# Patient Record
Sex: MALE | Race: WHITE | NOT HISPANIC OR LATINO | Employment: OTHER | URBAN - METROPOLITAN AREA
[De-identification: names, ages, dates, MRNs, and addresses within clinical notes are randomized per-mention and may not be internally consistent; named-entity substitution may affect disease eponyms.]

---

## 2017-10-02 ENCOUNTER — HOSPITAL ENCOUNTER (EMERGENCY)
Facility: OTHER | Age: 62
Discharge: HOME | End: 2017-10-02
Attending: EMERGENCY MEDICINE
Payer: COMMERCIAL

## 2017-10-02 VITALS
SYSTOLIC BLOOD PRESSURE: 159 MMHG | DIASTOLIC BLOOD PRESSURE: 90 MMHG | BODY MASS INDEX: 29.1 KG/M2 | TEMPERATURE: 98 F | WEIGHT: 192 LBS | HEART RATE: 78 BPM | RESPIRATION RATE: 15 BRPM | OXYGEN SATURATION: 97 % | HEIGHT: 68 IN

## 2017-10-02 DIAGNOSIS — N30.91 CYSTITIS WITH HEMATURIA: Primary | ICD-10-CM

## 2017-10-02 LAB
BACTERIA #/AREA URNS HPF: ABNORMAL /HPF
BILIRUB UR QL STRIP: ABNORMAL
CLARITY UR: ABNORMAL
COLOR UR: ABNORMAL
GLUCOSE UR QL STRIP: ABNORMAL
HGB UR QL STRIP: ABNORMAL
HYALINE CASTS #/AREA URNS LPF: 0 /LPF
KETONES UR QL STRIP: ABNORMAL
LEUKOCYTE ESTERASE UR QL STRIP: ABNORMAL
MICROSCOPIC COMMENT: ABNORMAL
NITRITE UR QL STRIP: POSITIVE
PH UR STRIP: 8 [PH] (ref 5–8)
PROT UR QL STRIP: ABNORMAL
RBC #/AREA URNS HPF: >100 /HPF (ref 0–4)
SP GR UR STRIP: 1.02 (ref 1–1.03)
SQUAMOUS #/AREA URNS HPF: 2 /HPF
URN SPEC COLLECT METH UR: ABNORMAL
UROBILINOGEN UR STRIP-ACNC: >=8 EU/DL
WBC #/AREA URNS HPF: 45 /HPF (ref 0–5)

## 2017-10-02 PROCEDURE — 99283 EMERGENCY DEPT VISIT LOW MDM: CPT

## 2017-10-02 PROCEDURE — 87086 URINE CULTURE/COLONY COUNT: CPT

## 2017-10-02 PROCEDURE — 81000 URINALYSIS NONAUTO W/SCOPE: CPT

## 2017-10-02 PROCEDURE — 87077 CULTURE AEROBIC IDENTIFY: CPT

## 2017-10-02 PROCEDURE — 87088 URINE BACTERIA CULTURE: CPT

## 2017-10-02 PROCEDURE — 87186 SC STD MICRODIL/AGAR DIL: CPT

## 2017-10-02 RX ORDER — LEVOFLOXACIN 250 MG/1
750 TABLET ORAL DAILY
Qty: 9 TABLET | Refills: 0 | Status: SHIPPED | OUTPATIENT
Start: 2017-10-02 | End: 2017-10-05

## 2017-10-02 RX ORDER — PHENAZOPYRIDINE HYDROCHLORIDE 200 MG/1
200 TABLET, FILM COATED ORAL 3 TIMES DAILY
Qty: 6 TABLET | Refills: 0 | Status: SHIPPED | OUTPATIENT
Start: 2017-10-02 | End: 2017-10-12

## 2017-10-03 NOTE — ED PROVIDER NOTES
"Encounter Date: 10/2/2017       History     Chief Complaint   Patient presents with    Hematuria     "Im peeing blood" states symptoms started today.     Patient is a 61 y/o male with no PMH who presents to the ED with hematuria. He reports dysuria, urinary frequency and dribbling for the past day. He reports onset of hematuria at 1830 today. He denies fever, flank pain, chills, abdominal pain, n/v/d, or history of renal stones. He states he had leftover Levoflaxacin from his last UTI and took one pta.       The history is provided by the patient.     Review of patient's allergies indicates:  No Known Allergies  No past medical history on file.  No past surgical history on file.  No family history on file.  Social History   Substance Use Topics    Smoking status: Not on file    Smokeless tobacco: Not on file    Alcohol use Not on file     Review of Systems   Constitutional: Negative for chills and fever.   HENT: Negative for congestion and sore throat.    Eyes: Negative for pain.   Respiratory: Negative for shortness of breath.    Cardiovascular: Negative for chest pain.   Gastrointestinal: Negative for abdominal pain, diarrhea, nausea and vomiting.   Genitourinary: Positive for dysuria, frequency, hematuria and urgency. Negative for discharge, flank pain, penile pain, penile swelling and scrotal swelling.   Musculoskeletal: Negative for arthralgias.   Skin: Negative for rash.   Neurological: Negative for headaches.       Physical Exam     Initial Vitals [10/02/17 1924]   BP Pulse Resp Temp SpO2   (!) 166/93 78 15 98.1 °F (36.7 °C) 97 %      MAP       117.33         Physical Exam    Constitutional: He is cooperative.   White male in no acute distress. He is sitting upright in the bed and speaks in clear and full sentences.    HENT:   Head: Normocephalic and atraumatic.   Mouth/Throat: Oropharynx is clear and moist.   Eyes: Conjunctivae and EOM are normal. Pupils are equal, round, and reactive to light.   Neck: " Normal range of motion. Neck supple.   Cardiovascular: Normal rate, regular rhythm and intact distal pulses.   Pulmonary/Chest: Breath sounds normal. He has no wheezes. He has no rhonchi. He has no rales.   Abdominal: Soft. Bowel sounds are normal. There is no tenderness. There is no rebound and no guarding.   Musculoskeletal: Normal range of motion. He exhibits no edema.   No flank pain    Neurological: He is alert. No cranial nerve deficit. GCS eye subscore is 4. GCS verbal subscore is 5. GCS motor subscore is 6.   Skin: Skin is warm and dry. Capillary refill takes less than 2 seconds. No rash noted.   Psychiatric: He has a normal mood and affect. His behavior is normal.         ED Course   Procedures  Labs Reviewed   URINALYSIS             Medical Decision Making:   Initial Assessment:   Urgent evaluation of a 62 y.o. male presenting to the emergency department complaining of hematuria. Patient is afebrile, nontoxic appearing and hemodynamically stable.  ED Management:  Patient's symptoms are consistent with cystitis. UA consistent with UTI and hematruia. No further intervention necessary. I do not suspect pyelonephritis. Will treat with Pyridium and Levofloxacin. I have instructed patient on the importance of finishing course of the antibiotics. I have advised for him to f/u with his PCP. I have given him specific return precautions. I have discussed the patient with the attending thoroughly and he/she agrees to the treatment and plan  Other:   I have discussed this case with another health care provider.                   ED Course      Clinical Impression:     1. Cystitis with hematuria                               Jose A Dailey PA-C  10/02/17 2029       Jose A Dailey PA-C  10/02/17 4748

## 2017-10-03 NOTE — ED TRIAGE NOTES
Pt presents to ED with c/o hematuria, dysuria, and frequency that started today. Pt denies any other symptoms. Pt AAO x4.

## 2017-10-05 LAB — BACTERIA UR CULT: NORMAL

## 2020-06-01 ENCOUNTER — LAB VISIT (OUTPATIENT)
Dept: LAB | Facility: OTHER | Age: 65
End: 2020-06-01
Attending: INTERNAL MEDICINE
Payer: COMMERCIAL

## 2020-06-01 ENCOUNTER — OFFICE VISIT (OUTPATIENT)
Dept: INTERNAL MEDICINE | Facility: CLINIC | Age: 65
End: 2020-06-01
Attending: INTERNAL MEDICINE
Payer: COMMERCIAL

## 2020-06-01 VITALS
WEIGHT: 201 LBS | SYSTOLIC BLOOD PRESSURE: 134 MMHG | HEIGHT: 68 IN | DIASTOLIC BLOOD PRESSURE: 80 MMHG | BODY MASS INDEX: 30.46 KG/M2 | HEART RATE: 66 BPM | OXYGEN SATURATION: 97 %

## 2020-06-01 DIAGNOSIS — D51.0 PERNICIOUS ANEMIA: ICD-10-CM

## 2020-06-01 DIAGNOSIS — Z12.5 SCREENING FOR PROSTATE CANCER: ICD-10-CM

## 2020-06-01 DIAGNOSIS — E03.9 HYPOTHYROIDISM, UNSPECIFIED TYPE: ICD-10-CM

## 2020-06-01 DIAGNOSIS — E55.9 VITAMIN D DEFICIENCY: ICD-10-CM

## 2020-06-01 DIAGNOSIS — Z00.00 ROUTINE ADULT HEALTH MAINTENANCE: Primary | ICD-10-CM

## 2020-06-01 DIAGNOSIS — R68.84 JAW PAIN, NON-TMJ: ICD-10-CM

## 2020-06-01 DIAGNOSIS — R79.89 OTHER SPECIFIED ABNORMAL FINDINGS OF BLOOD CHEMISTRY: ICD-10-CM

## 2020-06-01 DIAGNOSIS — E78.9 DISORDER OF LIPID METABOLISM: ICD-10-CM

## 2020-06-01 DIAGNOSIS — K21.9 GASTROESOPHAGEAL REFLUX DISEASE, ESOPHAGITIS PRESENCE NOT SPECIFIED: ICD-10-CM

## 2020-06-01 DIAGNOSIS — D50.8 OTHER IRON DEFICIENCY ANEMIA: ICD-10-CM

## 2020-06-01 LAB
25(OH)D3+25(OH)D2 SERPL-MCNC: 24 NG/ML (ref 30–96)
ALBUMIN SERPL BCP-MCNC: 4.1 G/DL (ref 3.5–5.2)
ALP SERPL-CCNC: 63 U/L (ref 55–135)
ALT SERPL W/O P-5'-P-CCNC: 54 U/L (ref 10–44)
ANION GAP SERPL CALC-SCNC: 10 MMOL/L (ref 8–16)
AST SERPL-CCNC: 19 U/L (ref 10–40)
BASOPHILS # BLD AUTO: 0.05 K/UL (ref 0–0.2)
BASOPHILS NFR BLD: 0.3 % (ref 0–1.9)
BILIRUB SERPL-MCNC: 0.6 MG/DL (ref 0.1–1)
BUN SERPL-MCNC: 17 MG/DL (ref 8–23)
CALCIUM SERPL-MCNC: 9.5 MG/DL (ref 8.7–10.5)
CHLORIDE SERPL-SCNC: 105 MMOL/L (ref 95–110)
CHOLEST SERPL-MCNC: 218 MG/DL (ref 120–199)
CHOLEST/HDLC SERPL: 4.7 {RATIO} (ref 2–5)
CO2 SERPL-SCNC: 24 MMOL/L (ref 23–29)
COMPLEXED PSA SERPL-MCNC: 0.63 NG/ML (ref 0–4)
CREAT SERPL-MCNC: 0.8 MG/DL (ref 0.5–1.4)
DIFFERENTIAL METHOD: ABNORMAL
EOSINOPHIL # BLD AUTO: 0.1 K/UL (ref 0–0.5)
EOSINOPHIL NFR BLD: 0.7 % (ref 0–8)
ERYTHROCYTE [DISTWIDTH] IN BLOOD BY AUTOMATED COUNT: 12.6 % (ref 11.5–14.5)
EST. GFR  (AFRICAN AMERICAN): >60 ML/MIN/1.73 M^2
EST. GFR  (NON AFRICAN AMERICAN): >60 ML/MIN/1.73 M^2
ESTIMATED AVG GLUCOSE: 111 MG/DL (ref 68–131)
GLUCOSE SERPL-MCNC: 89 MG/DL (ref 70–110)
HBA1C MFR BLD HPLC: 5.5 % (ref 4–5.6)
HCT VFR BLD AUTO: 48.7 % (ref 40–54)
HDLC SERPL-MCNC: 46 MG/DL (ref 40–75)
HDLC SERPL: 21.1 % (ref 20–50)
HGB BLD-MCNC: 16.2 G/DL (ref 14–18)
IMM GRANULOCYTES # BLD AUTO: 0.17 K/UL (ref 0–0.04)
IMM GRANULOCYTES NFR BLD AUTO: 1.2 % (ref 0–0.5)
LDLC SERPL CALC-MCNC: 135.6 MG/DL (ref 63–159)
LYMPHOCYTES # BLD AUTO: 1.7 K/UL (ref 1–4.8)
LYMPHOCYTES NFR BLD: 11.5 % (ref 18–48)
MCH RBC QN AUTO: 30.7 PG (ref 27–31)
MCHC RBC AUTO-ENTMCNC: 33.3 G/DL (ref 32–36)
MCV RBC AUTO: 92 FL (ref 82–98)
MONOCYTES # BLD AUTO: 1.2 K/UL (ref 0.3–1)
MONOCYTES NFR BLD: 7.8 % (ref 4–15)
NEUTROPHILS # BLD AUTO: 11.6 K/UL (ref 1.8–7.7)
NEUTROPHILS NFR BLD: 78.5 % (ref 38–73)
NONHDLC SERPL-MCNC: 172 MG/DL
NRBC BLD-RTO: 0 /100 WBC
PLATELET # BLD AUTO: 238 K/UL (ref 150–350)
PMV BLD AUTO: 10.9 FL (ref 9.2–12.9)
POTASSIUM SERPL-SCNC: 4 MMOL/L (ref 3.5–5.1)
PROT SERPL-MCNC: 6.8 G/DL (ref 6–8.4)
RBC # BLD AUTO: 5.27 M/UL (ref 4.6–6.2)
SODIUM SERPL-SCNC: 139 MMOL/L (ref 136–145)
TRIGL SERPL-MCNC: 182 MG/DL (ref 30–150)
TSH SERPL DL<=0.005 MIU/L-ACNC: 1.09 UIU/ML (ref 0.4–4)
VIT B12 SERPL-MCNC: 524 PG/ML (ref 210–950)
WBC # BLD AUTO: 14.77 K/UL (ref 3.9–12.7)

## 2020-06-01 PROCEDURE — 90670 PNEUMOCOCCAL CONJUGATE VACCINE 13-VALENT LESS THAN 5YO & GREATER THAN: ICD-10-PCS | Mod: S$GLB,,, | Performed by: INTERNAL MEDICINE

## 2020-06-01 PROCEDURE — 83036 HEMOGLOBIN GLYCOSYLATED A1C: CPT

## 2020-06-01 PROCEDURE — 90471 IMMUNIZATION ADMIN: CPT | Mod: S$GLB,,, | Performed by: INTERNAL MEDICINE

## 2020-06-01 PROCEDURE — 99387 INIT PM E/M NEW PAT 65+ YRS: CPT | Mod: 25,S$GLB,, | Performed by: INTERNAL MEDICINE

## 2020-06-01 PROCEDURE — 90670 PCV13 VACCINE IM: CPT | Mod: S$GLB,,, | Performed by: INTERNAL MEDICINE

## 2020-06-01 PROCEDURE — 84153 ASSAY OF PSA TOTAL: CPT

## 2020-06-01 PROCEDURE — 82607 VITAMIN B-12: CPT

## 2020-06-01 PROCEDURE — 85025 COMPLETE CBC W/AUTO DIFF WBC: CPT

## 2020-06-01 PROCEDURE — 99387 PR PREVENTIVE VISIT,NEW,65 & OVER: ICD-10-PCS | Mod: 25,S$GLB,, | Performed by: INTERNAL MEDICINE

## 2020-06-01 PROCEDURE — 82306 VITAMIN D 25 HYDROXY: CPT

## 2020-06-01 PROCEDURE — 80053 COMPREHEN METABOLIC PANEL: CPT

## 2020-06-01 PROCEDURE — 36415 COLL VENOUS BLD VENIPUNCTURE: CPT

## 2020-06-01 PROCEDURE — 90471 PNEUMOCOCCAL CONJUGATE VACCINE 13-VALENT LESS THAN 5YO & GREATER THAN: ICD-10-PCS | Mod: S$GLB,,, | Performed by: INTERNAL MEDICINE

## 2020-06-01 PROCEDURE — 84443 ASSAY THYROID STIM HORMONE: CPT

## 2020-06-01 PROCEDURE — 86803 HEPATITIS C AB TEST: CPT

## 2020-06-01 PROCEDURE — 80061 LIPID PANEL: CPT

## 2020-06-01 NOTE — PROGRESS NOTES
Subjective:       Patient ID: Sourav Griffith is a 65 y.o. male.    Chief Complaint: Annual Exam; Otalgia (jaw and ear on the left side / had consult with ENT / ongoing for 3+ weeks); and Gastroesophageal Reflux    He has had pain in his left jaw and ear for about 3 weeks.  He saw Dr. Mckinley who thought it might be trigeminal neuralgia.  He is finishing a round of antibiotics and Medrol Dosepak, and he does feel better.  He has had intermittent GERD since he was a teenager.  Any time he does not chew his food well, food sticks in his esophagus and he has to stop eating until it passes later.      Adult Wellness Exam:    Mental Conditions: None  Depression Risk Annual Factors: None  BMI: See Vital signs   Colon screen:    Refuses at this time                                     Vaccines (Flu, Adacel, Shingrix): See Health Maintenance Report  Routine labs (Cholesterol, Glucose/Hgb A1C, and TSH): ordered.     The patient's current health status is: Good   Patient was educated on all medical problems and routine health maintanence. See Patient Instructions.                             Gastroesophageal Reflux   He complains of dysphagia, early satiety, globus sensation and heartburn. This is a chronic problem. The current episode started more than 1 year ago. The problem occurs frequently.     Review of Systems   Constitutional: Negative.    Eyes: Negative.    Respiratory: Negative.    Cardiovascular: Negative.    Gastrointestinal: Positive for dysphagia and heartburn.   Musculoskeletal: Negative.    Neurological: Negative.    Psychiatric/Behavioral: Negative for dysphoric mood.       Objective:      Physical Exam   Constitutional: He is oriented to person, place, and time. He appears well-developed and well-nourished. No distress.   HENT:   Head: Normocephalic.   Right Ear: External ear and ear canal normal.   Left Ear: External ear and ear canal normal.   Nose: Nose normal.   Mouth/Throat: Oropharynx is clear and  moist. No oropharyngeal exudate.   Eyes: Pupils are equal, round, and reactive to light. Conjunctivae and EOM are normal. Right eye exhibits no discharge. Left eye exhibits no discharge. No scleral icterus.   Neck: Normal range of motion. Neck supple. No JVD present. No thyromegaly present.   Cardiovascular: Normal rate, regular rhythm, normal heart sounds and intact distal pulses. Exam reveals no gallop and no friction rub.   No murmur heard.  Pulmonary/Chest: Effort normal and breath sounds normal. No stridor. No respiratory distress. He has no wheezes. He has no rales. He exhibits no tenderness.   Abdominal: Soft. Bowel sounds are normal. He exhibits no distension and no mass. There is no tenderness. There is no rebound and no guarding.   Musculoskeletal: Normal range of motion. He exhibits no edema or tenderness.   Lymphadenopathy:     He has no cervical adenopathy.   Neurological: He is alert and oriented to person, place, and time. He has normal reflexes. He displays normal reflexes. No cranial nerve deficit. Coordination normal.   Skin: Skin is warm and dry. No rash noted. He is not diaphoretic.   Psychiatric: He has a normal mood and affect. His behavior is normal.   Nursing note and vitals reviewed.      Assessment:       1. Routine adult health maintenance    2. Gastroesophageal reflux disease, esophagitis presence not specified    3. Jaw pain, non-TMJ        Plan:       Per orders and D/C instructions.     he will continue A GERD diet for his chronic intermittent GERD symptoms.  He does not desire treatment or an EGD.  Follow-up with a dentist for the left jaw pain.  Check labs.

## 2020-06-01 NOTE — PATIENT INSTRUCTIONS
Tips for Healthy Living and Routine Preventative Care - 2020                                                            (These guidelines are intended for healthy adults)      1. Exercise  Exercise aerobically with a target heart rate of (220-age) x 0.8  Exercise 30-45 minutes on most days of the week    2. Diet and Supplements- All supplements can be obtained through a varied, healthy diet   Calcium: 1,000 - 1,200 mg each day   8 oz milk, Calcium fortified O.J., or Yogurt = 300 mg, 1oz of cheese =100-200 mg  Vitamin D: 800 iu each day- Can probably be obtained by 30 min. of direct sunlight    each day             3 oz. [place] = 800 iu,  3 oz. Tuna =150 iu, Milk or fortified O.J. = 120 iu  Fish oil: 1-2 grams each day or about 840 mg of EPA and DHA (Omega-3 fatty acids) each day             3 oz. [place]=2 grams,  3 oz. Tuna=1.3 grams,  3 oz. drained light Tuna= 0.25 grams  Folic acid 800 mcg each day for all women planning or capable of pregnancy    3. Lifestyle  Alcohol: 1 drink = 12 oz. domestic beer, 4 oz. wine, or 1 oz. hard (80 proof) liquor             Males: </= 14 drinks per week with no more than 4 in any one day             Females: </= 7 drinks per week with no more than 3 in any one day  Salt: 1.2 - 3 grams of Sodium each day.  Tobacco: Dont smoke, or quit smoking (discuss with your doctor)  Depression: If you feel depressed discuss with your doctor  Weight: Maintain a healthy body weight. Stay within 10% of:             Males: 106 lbs. + 6 lbs per inch height above 5 feet             Females: 100 lbs + 5 lbs per inch height above 5 feet    4. Routine tests  Blood pressure check at each visit, or at least once each year  HIV screening (one time) if less than 65 years old  Hepatitis C screen (one time) between the age of 18 and 79  Cholesterol screening every 3 years starting at age 21  Glucose check every 2-3 years starting at age 45  TSH (thyroid screen) every 2 years starting at age 50  Colonoscopy at  age 50, and repeat every 10 years until age 75  Vision screen at age 65    Females:  Pap smear every three years starting at age 21                  Stop screening at age 65 if past 3 pap smears were normal                  No screening for women who have had a hysterectomy with removal of cervix  Mammogram every 1-2 years starting at age 40 (or age 50) until age 75.  Bone density scan at about age 65      Males:  Consider PSA screening annually at age 50, age 45 for  Americans, until age 75                 5. Immunizations  Influenza vaccine every year in the fall, especially if >50 or with a chronic disease  Tetnus/Diptheria/Pertusis (Tdap) vaccine once (after the age of 18), then Tetnus/Diptheria (Td) vaccine every 10 years  Shingles (Shingrix) vaccine after age 50 and get a 2nd dose after 2-6 months  Pneumonia vaccine at age 65. 2nd Pneumonia vaccine at least 1 year later (1st should be Prevnar-13, and 2nd should be Pneumovax-23).

## 2020-06-02 LAB — HCV AB SERPL QL IA: NEGATIVE

## 2021-06-01 ENCOUNTER — OFFICE VISIT (OUTPATIENT)
Dept: INTERNAL MEDICINE | Facility: CLINIC | Age: 66
End: 2021-06-01
Attending: INTERNAL MEDICINE
Payer: COMMERCIAL

## 2021-06-01 VITALS
BODY MASS INDEX: 30.31 KG/M2 | OXYGEN SATURATION: 95 % | DIASTOLIC BLOOD PRESSURE: 76 MMHG | SYSTOLIC BLOOD PRESSURE: 130 MMHG | HEART RATE: 71 BPM | HEIGHT: 68 IN | WEIGHT: 200 LBS

## 2021-06-01 DIAGNOSIS — Z12.11 COLON CANCER SCREENING: ICD-10-CM

## 2021-06-01 DIAGNOSIS — H61.91 SKIN LESION OF RIGHT EAR: Primary | ICD-10-CM

## 2021-06-01 DIAGNOSIS — Z00.00 ROUTINE ADULT HEALTH MAINTENANCE: ICD-10-CM

## 2021-06-01 PROCEDURE — 99397 PR PREVENTIVE VISIT,EST,65 & OVER: ICD-10-PCS | Mod: 25,S$GLB,, | Performed by: INTERNAL MEDICINE

## 2021-06-01 PROCEDURE — 90471 IMMUNIZATION ADMIN: CPT | Mod: S$GLB,,, | Performed by: INTERNAL MEDICINE

## 2021-06-01 PROCEDURE — 99397 PER PM REEVAL EST PAT 65+ YR: CPT | Mod: 25,S$GLB,, | Performed by: INTERNAL MEDICINE

## 2021-06-01 PROCEDURE — 90471 PNEUMOCOCCAL POLYSACCHARIDE VACCINE 23-VALENT =>2YO SQ IM: ICD-10-PCS | Mod: S$GLB,,, | Performed by: INTERNAL MEDICINE

## 2021-06-01 PROCEDURE — 90732 PPSV23 VACC 2 YRS+ SUBQ/IM: CPT | Mod: S$GLB,,, | Performed by: INTERNAL MEDICINE

## 2021-06-01 PROCEDURE — 90732 PNEUMOCOCCAL POLYSACCHARIDE VACCINE 23-VALENT =>2YO SQ IM: ICD-10-PCS | Mod: S$GLB,,, | Performed by: INTERNAL MEDICINE

## 2021-06-18 LAB — NONINV COLON CA DNA+OCC BLD SCRN STL QL: NEGATIVE

## 2022-06-21 ENCOUNTER — OFFICE VISIT (OUTPATIENT)
Dept: INTERNAL MEDICINE | Facility: CLINIC | Age: 67
End: 2022-06-21
Attending: INTERNAL MEDICINE
Payer: MEDICARE

## 2022-06-21 ENCOUNTER — LAB VISIT (OUTPATIENT)
Dept: LAB | Facility: OTHER | Age: 67
End: 2022-06-21
Attending: INTERNAL MEDICINE
Payer: MEDICARE

## 2022-06-21 VITALS
HEIGHT: 68 IN | WEIGHT: 199 LBS | OXYGEN SATURATION: 97 % | BODY MASS INDEX: 30.16 KG/M2 | HEART RATE: 72 BPM | DIASTOLIC BLOOD PRESSURE: 68 MMHG | SYSTOLIC BLOOD PRESSURE: 122 MMHG

## 2022-06-21 DIAGNOSIS — E03.9 HYPOTHYROIDISM, UNSPECIFIED TYPE: ICD-10-CM

## 2022-06-21 DIAGNOSIS — E55.9 VITAMIN D DEFICIENCY: ICD-10-CM

## 2022-06-21 DIAGNOSIS — E66.9 OBESITY (BMI 30.0-34.9): ICD-10-CM

## 2022-06-21 DIAGNOSIS — Z12.5 SCREENING FOR PROSTATE CANCER: ICD-10-CM

## 2022-06-21 DIAGNOSIS — E78.9 DISORDER OF LIPID METABOLISM: ICD-10-CM

## 2022-06-21 DIAGNOSIS — R79.89 OTHER SPECIFIED ABNORMAL FINDINGS OF BLOOD CHEMISTRY: ICD-10-CM

## 2022-06-21 DIAGNOSIS — D51.0 PERNICIOUS ANEMIA: ICD-10-CM

## 2022-06-21 DIAGNOSIS — F17.210 CIGARETTE SMOKER: ICD-10-CM

## 2022-06-21 DIAGNOSIS — Z13.39 SCREENING FOR ALCOHOLISM: ICD-10-CM

## 2022-06-21 DIAGNOSIS — Z13.31 SCREENING FOR DEPRESSION: ICD-10-CM

## 2022-06-21 DIAGNOSIS — Z00.00 ROUTINE ADULT HEALTH MAINTENANCE: ICD-10-CM

## 2022-06-21 DIAGNOSIS — Z13.89 SCREENING FOR OBESITY: ICD-10-CM

## 2022-06-21 DIAGNOSIS — D50.8 OTHER IRON DEFICIENCY ANEMIA: ICD-10-CM

## 2022-06-21 DIAGNOSIS — H93.90 EAR LESION: Primary | ICD-10-CM

## 2022-06-21 PROBLEM — E66.811 OBESITY (BMI 30.0-34.9): Status: ACTIVE | Noted: 2022-06-21

## 2022-06-21 LAB
25(OH)D3+25(OH)D2 SERPL-MCNC: 27 NG/ML (ref 30–96)
ALBUMIN SERPL BCP-MCNC: 3.9 G/DL (ref 3.5–5.2)
ALP SERPL-CCNC: 67 U/L (ref 55–135)
ALT SERPL W/O P-5'-P-CCNC: 30 U/L (ref 10–44)
ANION GAP SERPL CALC-SCNC: 13 MMOL/L (ref 8–16)
AST SERPL-CCNC: 15 U/L (ref 10–40)
BASOPHILS # BLD AUTO: 0.03 K/UL (ref 0–0.2)
BASOPHILS NFR BLD: 0.3 % (ref 0–1.9)
BILIRUB SERPL-MCNC: 0.6 MG/DL (ref 0.1–1)
BUN SERPL-MCNC: 12 MG/DL (ref 8–23)
CALCIUM SERPL-MCNC: 9.9 MG/DL (ref 8.7–10.5)
CHLORIDE SERPL-SCNC: 105 MMOL/L (ref 95–110)
CHOLEST SERPL-MCNC: 219 MG/DL (ref 120–199)
CHOLEST/HDLC SERPL: 5.6 {RATIO} (ref 2–5)
CO2 SERPL-SCNC: 23 MMOL/L (ref 23–29)
COMPLEXED PSA SERPL-MCNC: 0.86 NG/ML (ref 0–4)
CREAT SERPL-MCNC: 0.9 MG/DL (ref 0.5–1.4)
DIFFERENTIAL METHOD: ABNORMAL
EOSINOPHIL # BLD AUTO: 0.2 K/UL (ref 0–0.5)
EOSINOPHIL NFR BLD: 2.4 % (ref 0–8)
ERYTHROCYTE [DISTWIDTH] IN BLOOD BY AUTOMATED COUNT: 12.3 % (ref 11.5–14.5)
EST. GFR  (AFRICAN AMERICAN): >60 ML/MIN/1.73 M^2
EST. GFR  (NON AFRICAN AMERICAN): >60 ML/MIN/1.73 M^2
ESTIMATED AVG GLUCOSE: 108 MG/DL (ref 68–131)
GLUCOSE SERPL-MCNC: 100 MG/DL (ref 70–110)
HBA1C MFR BLD: 5.4 % (ref 4–5.6)
HCT VFR BLD AUTO: 46.4 % (ref 40–54)
HDLC SERPL-MCNC: 39 MG/DL (ref 40–75)
HDLC SERPL: 17.8 % (ref 20–50)
HGB BLD-MCNC: 15.8 G/DL (ref 14–18)
IMM GRANULOCYTES # BLD AUTO: 0.04 K/UL (ref 0–0.04)
IMM GRANULOCYTES NFR BLD AUTO: 0.4 % (ref 0–0.5)
LDLC SERPL CALC-MCNC: 127.2 MG/DL (ref 63–159)
LYMPHOCYTES # BLD AUTO: 1.8 K/UL (ref 1–4.8)
LYMPHOCYTES NFR BLD: 18.9 % (ref 18–48)
MCH RBC QN AUTO: 32 PG (ref 27–31)
MCHC RBC AUTO-ENTMCNC: 34.1 G/DL (ref 32–36)
MCV RBC AUTO: 94 FL (ref 82–98)
MONOCYTES # BLD AUTO: 0.6 K/UL (ref 0.3–1)
MONOCYTES NFR BLD: 6.5 % (ref 4–15)
NEUTROPHILS # BLD AUTO: 6.7 K/UL (ref 1.8–7.7)
NEUTROPHILS NFR BLD: 71.5 % (ref 38–73)
NONHDLC SERPL-MCNC: 180 MG/DL
NRBC BLD-RTO: 0 /100 WBC
PLATELET # BLD AUTO: 218 K/UL (ref 150–450)
PMV BLD AUTO: 11.2 FL (ref 9.2–12.9)
POTASSIUM SERPL-SCNC: 4.2 MMOL/L (ref 3.5–5.1)
PROT SERPL-MCNC: 7 G/DL (ref 6–8.4)
RBC # BLD AUTO: 4.94 M/UL (ref 4.6–6.2)
SODIUM SERPL-SCNC: 141 MMOL/L (ref 136–145)
TRIGL SERPL-MCNC: 264 MG/DL (ref 30–150)
TSH SERPL DL<=0.005 MIU/L-ACNC: 0.94 UIU/ML (ref 0.4–4)
VIT B12 SERPL-MCNC: 512 PG/ML (ref 210–950)
WBC # BLD AUTO: 9.36 K/UL (ref 3.9–12.7)

## 2022-06-21 PROCEDURE — 80061 LIPID PANEL: CPT | Performed by: INTERNAL MEDICINE

## 2022-06-21 PROCEDURE — G0447 BEHAVIOR COUNSEL OBESITY 15M: HCPCS | Mod: 59,S$GLB,, | Performed by: INTERNAL MEDICINE

## 2022-06-21 PROCEDURE — 3008F PR BODY MASS INDEX (BMI) DOCUMENTED: ICD-10-PCS | Mod: CPTII,S$GLB,, | Performed by: INTERNAL MEDICINE

## 2022-06-21 PROCEDURE — 82607 VITAMIN B-12: CPT | Performed by: INTERNAL MEDICINE

## 2022-06-21 PROCEDURE — 84443 ASSAY THYROID STIM HORMONE: CPT | Performed by: INTERNAL MEDICINE

## 2022-06-21 PROCEDURE — 99406 PR TOBACCO USE CESSATION INTERMEDIATE 3-10 MINUTES: ICD-10-PCS | Mod: S$GLB,,, | Performed by: INTERNAL MEDICINE

## 2022-06-21 PROCEDURE — G0442 ANNUAL ALCOHOL SCREEN 15 MIN: HCPCS | Mod: 59,S$GLB,, | Performed by: INTERNAL MEDICINE

## 2022-06-21 PROCEDURE — G0444 DEPRESSION SCREEN ANNUAL: HCPCS | Mod: 59,S$GLB,, | Performed by: INTERNAL MEDICINE

## 2022-06-21 PROCEDURE — 80053 COMPREHEN METABOLIC PANEL: CPT | Performed by: INTERNAL MEDICINE

## 2022-06-21 PROCEDURE — 3008F BODY MASS INDEX DOCD: CPT | Mod: CPTII,S$GLB,, | Performed by: INTERNAL MEDICINE

## 2022-06-21 PROCEDURE — 99213 PR OFFICE/OUTPT VISIT, EST, LEVL III, 20-29 MIN: ICD-10-PCS | Mod: 25,S$GLB,, | Performed by: INTERNAL MEDICINE

## 2022-06-21 PROCEDURE — 1160F RVW MEDS BY RX/DR IN RCRD: CPT | Mod: CPTII,S$GLB,, | Performed by: INTERNAL MEDICINE

## 2022-06-21 PROCEDURE — 85025 COMPLETE CBC W/AUTO DIFF WBC: CPT | Performed by: INTERNAL MEDICINE

## 2022-06-21 PROCEDURE — 82306 VITAMIN D 25 HYDROXY: CPT | Performed by: INTERNAL MEDICINE

## 2022-06-21 PROCEDURE — 99406 BEHAV CHNG SMOKING 3-10 MIN: CPT | Mod: S$GLB,,, | Performed by: INTERNAL MEDICINE

## 2022-06-21 PROCEDURE — 3078F DIAST BP <80 MM HG: CPT | Mod: CPTII,S$GLB,, | Performed by: INTERNAL MEDICINE

## 2022-06-21 PROCEDURE — 1159F PR MEDICATION LIST DOCUMENTED IN MEDICAL RECORD: ICD-10-PCS | Mod: CPTII,S$GLB,, | Performed by: INTERNAL MEDICINE

## 2022-06-21 PROCEDURE — G0444 PR DEPRESSION SCREENING: ICD-10-PCS | Mod: 59,S$GLB,, | Performed by: INTERNAL MEDICINE

## 2022-06-21 PROCEDURE — 3074F SYST BP LT 130 MM HG: CPT | Mod: CPTII,S$GLB,, | Performed by: INTERNAL MEDICINE

## 2022-06-21 PROCEDURE — G0447 PR OBESITY COUNSELING: ICD-10-PCS | Mod: 59,S$GLB,, | Performed by: INTERNAL MEDICINE

## 2022-06-21 PROCEDURE — 3078F PR MOST RECENT DIASTOLIC BLOOD PRESSURE < 80 MM HG: ICD-10-PCS | Mod: CPTII,S$GLB,, | Performed by: INTERNAL MEDICINE

## 2022-06-21 PROCEDURE — 84153 ASSAY OF PSA TOTAL: CPT | Performed by: INTERNAL MEDICINE

## 2022-06-21 PROCEDURE — 99213 OFFICE O/P EST LOW 20 MIN: CPT | Mod: 25,S$GLB,, | Performed by: INTERNAL MEDICINE

## 2022-06-21 PROCEDURE — 1159F MED LIST DOCD IN RCRD: CPT | Mod: CPTII,S$GLB,, | Performed by: INTERNAL MEDICINE

## 2022-06-21 PROCEDURE — 36415 COLL VENOUS BLD VENIPUNCTURE: CPT | Performed by: INTERNAL MEDICINE

## 2022-06-21 PROCEDURE — 83036 HEMOGLOBIN GLYCOSYLATED A1C: CPT | Performed by: INTERNAL MEDICINE

## 2022-06-21 PROCEDURE — 1160F PR REVIEW ALL MEDS BY PRESCRIBER/CLIN PHARMACIST DOCUMENTED: ICD-10-PCS | Mod: CPTII,S$GLB,, | Performed by: INTERNAL MEDICINE

## 2022-06-21 PROCEDURE — G0442 PR  ALCOHOL SCREENING: ICD-10-PCS | Mod: 59,S$GLB,, | Performed by: INTERNAL MEDICINE

## 2022-06-21 PROCEDURE — 3074F PR MOST RECENT SYSTOLIC BLOOD PRESSURE < 130 MM HG: ICD-10-PCS | Mod: CPTII,S$GLB,, | Performed by: INTERNAL MEDICINE

## 2022-06-21 NOTE — PROGRESS NOTES
Subjective:       Patient ID: Sourav Griffith is a 67 y.o. male.    Chief Complaint: Annual Exam and Rash (Back )    He has not seen derm about the lesion behind his right ear.    Review of Systems   Constitutional: Negative.    Respiratory: Negative.    Cardiovascular: Negative.          Objective:      Physical Exam  Vitals and nursing note reviewed.   Constitutional:       Appearance: He is well-developed.   HENT:      Head: Normocephalic and atraumatic.        Comments: About 1 x 1 cm skin colored raised lesion with an area of central necrosis.  Eyes:      Pupils: Pupils are equal, round, and reactive to light.   Cardiovascular:      Rate and Rhythm: Normal rate and regular rhythm.      Heart sounds: Normal heart sounds.   Pulmonary:      Effort: Pulmonary effort is normal.   Neurological:      Mental Status: He is alert.         Assessment:       Problem List Items Addressed This Visit        Unprioritized    Obesity (BMI 30.0-34.9)    Cigarette smoker      Other Visit Diagnoses     Ear lesion    -  Primary    Relevant Orders    Ambulatory referral/consult to Dermatology    Routine adult health maintenance        Screening for depression        Screening for alcoholism        Screening for obesity              Plan:       Per orders and D/C instructions.     refer to dermatology for right ear lesion.  Check routine labs.    Discussed risks of smoking including CAD, MI, CVA, and Cancer. Discussed options to quit including OTC and prescription meds (spent 5 mins).    Screening: The patient was screened for depression with the PHQ2 questionnaire and possible health consequences were discussed with the patient, who understands (15 minutes spent).       The patient was screened for the misuse of alcohol, by asking the number of drinks per average week, and if pt has had more than 4 drinks (more than 3 for women and elderly) in 1 day within the past year. The health and legal consequences of misuse were discussed (15  minutes spent).       The patient was screened for obesity (BMI>30), Since the current BMI is > 30, the possible consequences of obesity, as well as the benefits of diet, exercise, and weight loss were discussed. Any behavioral risks were identified, and methods to achieve appropriate treatment goals were discussed (15 minutes spent).

## 2022-06-21 NOTE — PATIENT INSTRUCTIONS
Try to quit smoking.  Consider using a nicotine patch or taking Chantix if needed.  Try to increase her exercise level and lose a few lb.      Tips for Healthy Living and Routine Preventative Care - 2022                                                            (These guidelines are intended for healthy adults)      1. Exercise  Exercise aerobically with a target heart rate of (220-age) x 0.8  Exercise 30-45 minutes on most days of the week    2. Diet and Supplements- All supplements can be obtained through a varied, healthy diet   Calcium: 1,000 - 1,200 mg each day   8 oz milk or Calcium fortified O.J. = 300, 8 oz Yogurt = 400 mg, 1 oz of cheese =100-200 mg              8 oz Oatmeal = 215 mg, 3 oz [place] = 240 mg  Vitamin D: 800 iu each day- Can probably be obtained by 30 min. of direct sunlight    each day             3 oz. [place] = 800 iu,  3 oz. Tuna =150 iu, Milk or fortified O.J. = 120 iu  Fish oil: 1-2 grams each day or about 840 mg of EPA and DHA (Omega-3 fatty acids) each day             3 oz. [place]=2 grams,  3 oz. Tuna=1.3 grams,  3 oz. drained light Tuna= 0.25 grams  Folic acid 800 mcg each day for all women planning or capable of pregnancy    3. Lifestyle  Alcohol: 1 drink = 12 oz. domestic beer, 4 oz. wine, or 1 oz. hard (80 proof) liquor             Males: </= 14 drinks per week with no more than 4 in any one day             Females: </= 7 drinks per week with no more than 3 in any one day  Salt: 1.2 - 3 grams of Sodium each day.  Tobacco: Dont smoke, or quit smoking (discuss with your doctor)  Depression: If you feel depressed discuss with your doctor  Weight: Maintain a healthy body weight. Stay within 10% of:             Males: 106 lbs. + 6 lbs per inch height above 5 feet             Females: 100 lbs + 5 lbs per inch height above 5 feet    4. Routine tests  Blood pressure check at each visit, or at least once each year  HIV screening (one time) if less than 65 years old  Hepatitis C screen (one  time) if less than 80 years old  Cholesterol screening every 3 years starting at age 21  Glucose/Hemaglobin A1C check every 2-3 years starting at age 45. Check at age 35 if overweight  TSH (thyroid screen) every 2 years starting at age 50  Colonoscopy at age 50 (or age 45), and repeat every 10 years until age 75. Consider screening until age 85  Vision screen at age 65    Females:  Gyn exam with cervical HPV test every 3 years or Pap smear every three years starting at age 25                  Stop screening at age 65 if past 3 exams were normal                  No screening for women who have had a hysterectomy with removal of cervix  Mammogram every 1-2 years starting at age 40 (or age 50) until age 75  Consider continuing Mammograms every other year for those older than 75 with a life expectancy of more than 10 years  Bone density scan at about age 65    Males:  PSA screening annually at age 50, age 45 for  Americans, until age 75. Consider annual screening after age 75                   5. Immunizations  Influenza vaccine every year in the fall, especially if >50 or with a chronic disease  Tetanus/Diphtheria/Pertussis (Tdap) vaccine once (after the age of 18), then Tetanus/Diphtheria (Td) or Tdap vaccine every 10 years  Shingles (Shingrix) vaccine after age 50 and get a 2nd dose after 2-6 months  Pneumonia vaccine at age 65. 2nd Pneumonia vaccine at least 1 year later (1st should be Prevnar-13, and 2nd should be Pneumovax-23).        6. Advanced Directive/End of life care planning  You should consider having a signed document which informs physicians and family of your end of life care wishes.  You can go to [email or web address]. Under Step 1 click download AdobePDF and print.  This is the [place] physician order for scope of Treatment ([employer]) form.  You can also request a blank copy of the [employer] form from my office.  Bring a copy of the signed document to my office so we can scan it in your  medical chart.

## 2022-08-10 ENCOUNTER — TELEPHONE (OUTPATIENT)
Dept: DERMATOLOGY | Facility: CLINIC | Age: 67
End: 2022-08-10
Payer: MEDICARE

## 2022-08-10 NOTE — TELEPHONE ENCOUNTER
NP informed of bx results proven BCC on right posterior ear. Scheduled for same-day Mohs 10/5 at 7:50am. Over-the-phone consult completed. Pt confirmed date, time, and location. NP appt packet sent in the mail.

## 2022-10-03 DIAGNOSIS — C44.212 BASAL CELL CARCINOMA (BCC) OF SKIN OF RIGHT EAR: Primary | ICD-10-CM

## 2022-10-05 ENCOUNTER — PROCEDURE VISIT (OUTPATIENT)
Dept: DERMATOLOGY | Facility: CLINIC | Age: 67
End: 2022-10-05
Payer: MEDICARE

## 2022-10-05 VITALS
HEIGHT: 68 IN | BODY MASS INDEX: 30.16 KG/M2 | WEIGHT: 199 LBS | HEART RATE: 76 BPM | DIASTOLIC BLOOD PRESSURE: 99 MMHG | SYSTOLIC BLOOD PRESSURE: 154 MMHG

## 2022-10-05 DIAGNOSIS — C44.212 BASAL CELL CARCINOMA (BCC) OF SKIN OF RIGHT EAR: ICD-10-CM

## 2022-10-05 PROCEDURE — 14060 TIS TRNFR E/N/E/L 10 SQ CM/<: CPT | Mod: S$GLB,,, | Performed by: DERMATOLOGY

## 2022-10-05 PROCEDURE — 99499 NO LOS: ICD-10-PCS | Mod: S$GLB,,, | Performed by: DERMATOLOGY

## 2022-10-05 PROCEDURE — 17311 MOHS 1 STAGE H/N/HF/G: CPT | Mod: 51,S$GLB,, | Performed by: DERMATOLOGY

## 2022-10-05 PROCEDURE — 14060 PR ADJ TISS XFER LID,NOS,EAR <10 SQCM: ICD-10-PCS | Mod: S$GLB,,, | Performed by: DERMATOLOGY

## 2022-10-05 PROCEDURE — 17311: ICD-10-PCS | Mod: 51,S$GLB,, | Performed by: DERMATOLOGY

## 2022-10-05 PROCEDURE — 99499 UNLISTED E&M SERVICE: CPT | Mod: S$GLB,,, | Performed by: DERMATOLOGY

## 2022-10-05 RX ORDER — CEPHALEXIN 500 MG/1
500 CAPSULE ORAL 3 TIMES DAILY
Qty: 21 CAPSULE | Refills: 0 | Status: SHIPPED | OUTPATIENT
Start: 2022-10-05 | End: 2022-10-12

## 2022-10-05 RX ORDER — HYDROCODONE BITARTRATE AND ACETAMINOPHEN 5; 325 MG/1; MG/1
1 TABLET ORAL EVERY 6 HOURS PRN
Qty: 10 TABLET | Refills: 0 | Status: SHIPPED | OUTPATIENT
Start: 2022-10-05 | End: 2023-04-04

## 2022-10-05 NOTE — PROGRESS NOTES
Physical Exam   Skin:             R inferior posterior ear surface.     Biopsy proven nodular basal cell carcinoma- R posterior ear, path# [id number].  Diagnosis, photograph, and pathology report were reviewed with patient.  Discussed risks, benefits, and alternatives of Mohs surgery.  Discussed repair options including complex closure, skin flap, skin graft, and second intention healing.  Advised patient to stop smoking to aid in wound healing.  Patient agreed to proceed with Mohs surgery today.      PROCEDURE: Mohs' Micrographic Surgery    INDICATION: Location in mask areas of face including central face, nose, eyelids, eyebrows, lips, chin, preauricular, temple, and ear. Biopsy-proven skin cancer of cosmetically and functionally important areas, including head, neck, genital, hand, foot, or areas known for having difficulty in healing, such as the lower anterior legs. Tumor with ill-defined borders.    REFERRING PROVIDER:  Sarahi Kendall MD    CASE NUMBER:     ANESTHETIC: 2.5 cc 0.5% Lidocaine with Epi 1:200,000 mixed 1:1 with 0.5% Bupivacaine    SURGICAL PREP: Betadine    SURGEON: Randy Kessler MD    ASSISTANTS:  Pal[facility] Junior Surg King    PREOPERATIVE DIAGNOSIS: basal cell carcinoma- nodular    POSTOPERATIVE DIAGNOSIS: basal cell carcinoma    PATHOLOGIC DIAGNOSIS: basal cell carcinoma- nodular    HISTOLOGY OF SPECIMENS IN FIRST STAGE:   Tumor Type:  No tumor seen.    STAGES OF MOHS' SURGERY PERFORMED: 1    TUMOR-FREE PLANE ACHIEVED: Yes    HEMOSTASIS: electrocoagulation     SPECIMENS: 2    LOCATION: right posterior ear. Location verified with Dr. Kendall's clinical photograph. Patient also verified location by looking at photo taken prior to procedure.     INITIAL LESION SIZE: 0.7 x 0.7 cm    FINAL DEFECT SIZE: 1.1 x 1.3 cm    WOUND REPAIR/DISPOSITION: The patient tolerated Mohs' Micrographic Surgery for a basal cell carcinoma very well. When the tumor was completely removed, a repair of  the surgical defect was undertaken.    PROCEDURE: Single Advancement Flap (Adjacent Tissue Transfer)    INDICATION: Status post Mohs' Micrographic Surgery for basal cell carcinoma.    CASE NUMBER:     ANESTHETIC: 2.5 cc 2% Lidocaine with Epinephrine 1:100,000    SURGICAL PREP: Betadine, prepped by  [facility] Surg Tech    SURGEON: Randy Kessler MD    ASSISTANTS: Halley Tejada PA-C and Jenny Oates    LOCATION: right posterior ear    DEFECT SIZE: 1.1 x 1.3 cm    WOUND REPAIR/DISPOSITION:  After the patient's carcinoma had been completely removed with Mohs' Micrographic Surgery, a repair of the surgical defect was undertaken. The patient was returned to the operating suite where the area of right posterior ear was prepped, draped, and anesthetized in the usual sterile fashion. A single advancement flap was designed in the inferomedial surrounding tissue of the right posterior ear along the postauricular sulcus. A Burow's triangle was drawn in laterally to help with tissue movement. Then with a #15 blade the flap was incised and the Burow's triangle was excised, the area was widely undermined in the subcutaneous tissue plane. Then, electrocoagulation was used to obtain meticulous hemostasis. A 5-0 Vicryl pexing suture was performed by attaching the underside of the most superior aspect of the flap to the right posterior ear. The flap was then advanced in by a complex closure. Then, 4-0 Vicryl and 5-0 Vicryl buried vertical mattress sutures were placed into the subcutaneous and dermal plane to close the wound and delroy the cutaneous wound edge. The flap was then trimmed to fit the defect. The cutaneous wound edges were closed using interrupted 5-0 Prolene sutures.    The patient tolerated the procedure well.    The area was cleaned and dressed appropriately and the patient was given wound care instructions, as well as an appointment for follow-up evaluation and suture removal in 7 days. Patient was  "placed on Norco 5-325 mg prn postop pain and Keflex 500 mg TID x 7 days.    SIZE OF FLAP: 6 cm squared    Vitals:    10/05/22 0730 10/05/22 1000   BP: (!) 145/86 (!) 154/99   BP Location: Left arm Left arm   Patient Position: Sitting Sitting   BP Method: Medium (Automatic) Small (Automatic)   Pulse: 73 76   Weight: 90.3 kg (199 lb)    Height: 5' 8" (1.727 m)        "

## 2022-10-12 ENCOUNTER — OFFICE VISIT (OUTPATIENT)
Dept: DERMATOLOGY | Facility: CLINIC | Age: 67
End: 2022-10-12
Payer: MEDICARE

## 2022-10-12 DIAGNOSIS — Z09 POSTOP CHECK: Primary | ICD-10-CM

## 2022-10-12 PROCEDURE — 99999 PR PBB SHADOW E&M-EST. PATIENT-LVL II: ICD-10-PCS | Mod: PBBFAC,,, | Performed by: DERMATOLOGY

## 2022-10-12 PROCEDURE — 99999 PR PBB SHADOW E&M-EST. PATIENT-LVL II: CPT | Mod: PBBFAC,,, | Performed by: DERMATOLOGY

## 2022-10-12 PROCEDURE — 1101F PR PT FALLS ASSESS DOC 0-1 FALLS W/OUT INJ PAST YR: ICD-10-PCS | Mod: CPTII,S$GLB,, | Performed by: DERMATOLOGY

## 2022-10-12 PROCEDURE — 1126F PR PAIN SEVERITY QUANTIFIED, NO PAIN PRESENT: ICD-10-PCS | Mod: CPTII,S$GLB,, | Performed by: DERMATOLOGY

## 2022-10-12 PROCEDURE — 3288F FALL RISK ASSESSMENT DOCD: CPT | Mod: CPTII,S$GLB,, | Performed by: DERMATOLOGY

## 2022-10-12 PROCEDURE — 3044F HG A1C LEVEL LT 7.0%: CPT | Mod: CPTII,S$GLB,, | Performed by: DERMATOLOGY

## 2022-10-12 PROCEDURE — 1126F AMNT PAIN NOTED NONE PRSNT: CPT | Mod: CPTII,S$GLB,, | Performed by: DERMATOLOGY

## 2022-10-12 PROCEDURE — 3044F PR MOST RECENT HEMOGLOBIN A1C LEVEL <7.0%: ICD-10-PCS | Mod: CPTII,S$GLB,, | Performed by: DERMATOLOGY

## 2022-10-12 PROCEDURE — 99024 POSTOP FOLLOW-UP VISIT: CPT | Mod: S$GLB,,, | Performed by: DERMATOLOGY

## 2022-10-12 PROCEDURE — 1159F PR MEDICATION LIST DOCUMENTED IN MEDICAL RECORD: ICD-10-PCS | Mod: CPTII,S$GLB,, | Performed by: DERMATOLOGY

## 2022-10-12 PROCEDURE — 3288F PR FALLS RISK ASSESSMENT DOCUMENTED: ICD-10-PCS | Mod: CPTII,S$GLB,, | Performed by: DERMATOLOGY

## 2022-10-12 PROCEDURE — 1101F PT FALLS ASSESS-DOCD LE1/YR: CPT | Mod: CPTII,S$GLB,, | Performed by: DERMATOLOGY

## 2022-10-12 PROCEDURE — 99024 PR POST-OP FOLLOW-UP VISIT: ICD-10-PCS | Mod: S$GLB,,, | Performed by: DERMATOLOGY

## 2022-10-12 PROCEDURE — 1159F MED LIST DOCD IN RCRD: CPT | Mod: CPTII,S$GLB,, | Performed by: DERMATOLOGY

## 2022-10-12 NOTE — PROGRESS NOTES
67 y.o. male patient is here for suture removal following Mohs' surgery.    Patient reports no problems.    WOUND PE:  The R posterior ear sutures intact. Wound healing well. Good skin edges. No signs or symptoms of infection.    IMPRESSION:  Healing operative site from Mohs' surgery, BCC R posterior ear s/p Mohs with Single Advancement Flap, postop day #7.    PLAN:  Sutures removed today by  Meka Costello MA .   Steri-strips applied.  Keep moist with Aquaphor.  Call if any issues arise    RTC:  In 3-6 months with  Sarahi Kendall M.D.  for skin check or sooner if new concern arises.

## 2023-02-06 ENCOUNTER — TELEPHONE (OUTPATIENT)
Dept: DERMATOLOGY | Facility: CLINIC | Age: 68
End: 2023-02-06
Payer: MEDICARE

## 2023-02-06 NOTE — TELEPHONE ENCOUNTER
EP scheduled for same-day Mohs 4/4 11:30am BCC R inferior parietal scalp, Dr. Kendall referral. Pt confirmed date, time, and location. Appt information sent in the mail.

## 2023-04-04 ENCOUNTER — PROCEDURE VISIT (OUTPATIENT)
Dept: DERMATOLOGY | Facility: CLINIC | Age: 68
End: 2023-04-04
Payer: MEDICARE

## 2023-04-04 VITALS
HEIGHT: 68 IN | DIASTOLIC BLOOD PRESSURE: 85 MMHG | HEART RATE: 56 BPM | BODY MASS INDEX: 30.16 KG/M2 | WEIGHT: 199 LBS | SYSTOLIC BLOOD PRESSURE: 133 MMHG

## 2023-04-04 DIAGNOSIS — C44.41 BASAL CELL CARCINOMA, SCALP/NECK: Primary | ICD-10-CM

## 2023-04-04 PROCEDURE — 13132 PR RECMPL WND HEAD,FAC,HAND 2.6-7.5 CM: ICD-10-PCS | Mod: 51,S$GLB,, | Performed by: DERMATOLOGY

## 2023-04-04 PROCEDURE — 17311 MOHS 1 STAGE H/N/HF/G: CPT | Mod: S$GLB,,, | Performed by: DERMATOLOGY

## 2023-04-04 PROCEDURE — 13132 CMPLX RPR F/C/C/M/N/AX/G/H/F: CPT | Mod: 51,S$GLB,, | Performed by: DERMATOLOGY

## 2023-04-04 PROCEDURE — 99499 NO LOS: ICD-10-PCS | Mod: S$GLB,,, | Performed by: DERMATOLOGY

## 2023-04-04 PROCEDURE — 99499 UNLISTED E&M SERVICE: CPT | Mod: S$GLB,,, | Performed by: DERMATOLOGY

## 2023-04-04 PROCEDURE — 17311: ICD-10-PCS | Mod: S$GLB,,, | Performed by: DERMATOLOGY

## 2023-04-04 NOTE — PROGRESS NOTES
PROCEDURE: Mohs' Micrographic Surgery    INDICATION: Biopsy-proven skin cancer of cosmetically and functionally important areas, including head, neck, genital, hand, foot, or areas known for having difficulty in healing, such as the lower anterior legs. Tumor with ill-defined borders.    REFERRING PROVIDER:  Sarahi Kendall MD    CASE NUMBER:     ANESTHETIC: 2 cc 0.5% Bupivacaine with Epi 1:200,000 mixed 1:1 with plain 1% Lidocaine    SURGICAL PREP: Hibiclens    SURGEON: Randy Kessler MD    ASSISTANTS: Halley Tejada PA-C and Sonam Rowland Surg Tech    PREOPERATIVE DIAGNOSIS: basal cell carcinoma- nodular    POSTOPERATIVE DIAGNOSIS: basal cell carcinoma- nodular, micronodular    PATHOLOGIC DIAGNOSIS: basal cell carcinoma- nodular, micronodular    HISTOLOGY OF SPECIMENS IN FIRST STAGE:   Debulking tumor confirms nodular and micronodular basal cell carcinoma.  Tumor Type:  No tumor seen.    STAGES OF MOHS' SURGERY PERFORMED: 1    TUMOR-FREE PLANE ACHIEVED: Yes    HEMOSTASIS: electrocoagulation     SPECIMENS: 2     LOCATION: right inferior parietal scalp (postauricular scalp/neck). Location verified with Dr. Kendall's clinical photograph. Patient also verified location by looking at photo taken prior to procedure.     INITIAL LESION SIZE: 0.4 x 0.7 cm    FINAL DEFECT SIZE: 1.1 x 1.2 cm    WOUND REPAIR/DISPOSITION: The patient tolerated Mohs' Micrographic Surgery for a basal cell carcinoma very well. When the tumor was completely removed, a repair of the surgical defect was undertaken.    PROCEDURE: Complex Linear Repair    INDICATION: Status post Mohs' Micrographic Surgery for basal cell carcinoma.    CASE NUMBER:     SURGEON: Randy Kessler MD    ASSISTANTS: Halley Tejada PA-C and Sonam Rowland Surg Tech    ANESTHETIC: 2 cc 1% Lidocaine with Epinephrine 1:100,000    SURGICAL PREP: Hibiclens, prepped by Sonam Rowland Surg Tech    LOCATION: right inferior parietal scalp (postauricular  "scalp/neck)    DEFECT SIZE: 1.1 x 1.2 cm    WOUND REPAIR/DISPOSITION:  After the patient's carcinoma had been completely removed with Mohs' Micrographic Surgery, a repair of the surgical defect was undertaken. The patient was returned to the operating suite where the area of right postauricular scalp was prepped, draped, and anesthetized in the usual sterile fashion. The wound was widely undermined in all directions. The wound was undermined to a distance at least the maximum width of the defect as measured perpendicular to the closure line along at least one entire edge of the defect, in this case 2 cm. Then, electrocoagulation was used to obtain meticulous hemostasis. 4-0 Vicryl buried vertical mattress sutures were placed into the subcutaneous and dermal plane to close the wound and delroy the cutaneous wound edge. Bilateral dog ears were identified and were removed by a standard Burow's triangle technique. The cutaneous wound edges were closed using interrupted 5-0 Prolene suture.    The patient tolerated the procedure well.    The area was cleaned and dressed appropriately and the patient was given wound care instructions, as well as appointment for follow-up evaluation and suture removal in 10 days.    LENGTH OF REPAIR: 2.8 cm    Vitals:    04/04/23 1114 04/04/23 1307   BP: 122/77 133/85   BP Location: Left arm Left arm   Patient Position: Sitting Lying   BP Method: Medium (Automatic) Small (Automatic)   Pulse: 65 (!) 56   Weight: 90.3 kg (199 lb)    Height: 5' 8" (1.727 m)          "

## 2023-04-13 ENCOUNTER — OFFICE VISIT (OUTPATIENT)
Dept: DERMATOLOGY | Facility: CLINIC | Age: 68
End: 2023-04-13
Payer: MEDICARE

## 2023-04-13 DIAGNOSIS — Z09 POSTOP CHECK: Primary | ICD-10-CM

## 2023-04-13 PROCEDURE — 1126F PR PAIN SEVERITY QUANTIFIED, NO PAIN PRESENT: ICD-10-PCS | Mod: CPTII,S$GLB,, | Performed by: DERMATOLOGY

## 2023-04-13 PROCEDURE — 99024 POSTOP FOLLOW-UP VISIT: CPT | Mod: S$GLB,,, | Performed by: DERMATOLOGY

## 2023-04-13 PROCEDURE — 1101F PT FALLS ASSESS-DOCD LE1/YR: CPT | Mod: CPTII,S$GLB,, | Performed by: DERMATOLOGY

## 2023-04-13 PROCEDURE — 99999 PR PBB SHADOW E&M-EST. PATIENT-LVL I: CPT | Mod: PBBFAC,,, | Performed by: DERMATOLOGY

## 2023-04-13 PROCEDURE — 3288F FALL RISK ASSESSMENT DOCD: CPT | Mod: CPTII,S$GLB,, | Performed by: DERMATOLOGY

## 2023-04-13 PROCEDURE — 1159F MED LIST DOCD IN RCRD: CPT | Mod: CPTII,S$GLB,, | Performed by: DERMATOLOGY

## 2023-04-13 PROCEDURE — 99024 PR POST-OP FOLLOW-UP VISIT: ICD-10-PCS | Mod: S$GLB,,, | Performed by: DERMATOLOGY

## 2023-04-13 PROCEDURE — 1101F PR PT FALLS ASSESS DOC 0-1 FALLS W/OUT INJ PAST YR: ICD-10-PCS | Mod: CPTII,S$GLB,, | Performed by: DERMATOLOGY

## 2023-04-13 PROCEDURE — 3288F PR FALLS RISK ASSESSMENT DOCUMENTED: ICD-10-PCS | Mod: CPTII,S$GLB,, | Performed by: DERMATOLOGY

## 2023-04-13 PROCEDURE — 99999 PR PBB SHADOW E&M-EST. PATIENT-LVL I: ICD-10-PCS | Mod: PBBFAC,,, | Performed by: DERMATOLOGY

## 2023-04-13 PROCEDURE — 1159F PR MEDICATION LIST DOCUMENTED IN MEDICAL RECORD: ICD-10-PCS | Mod: CPTII,S$GLB,, | Performed by: DERMATOLOGY

## 2023-04-13 PROCEDURE — 1126F AMNT PAIN NOTED NONE PRSNT: CPT | Mod: CPTII,S$GLB,, | Performed by: DERMATOLOGY

## 2023-04-13 NOTE — PROGRESS NOTES
68 y.o. male patient is here for suture removal following Mohs' surgery.    Patient reports no problems with right inferior parietal scalp (postauricular scalp/neck)    WOUND PE:  The right inferior parietal scalp (postauricular scalp/neck) sutures intact. Wound healing well. Good skin edges. No signs or symptoms of infection.    IMPRESSION:  Healing operative site from Mohs' surgery BCC, right inferior parietal scalp (postauricular scalp/neck) s/p Mohs with CLC, postop day # 9.    PLAN:  Sutures removed today by Shell Tovar MA. Steri-strips applied.  Continue wound care.  Keep moist with Aquaphor.    RTC:  In 3-6 months with  Sarahi Kendall MD  for skin check or sooner if new concern arises.

## 2023-06-22 ENCOUNTER — LAB VISIT (OUTPATIENT)
Dept: LAB | Facility: OTHER | Age: 68
End: 2023-06-22
Attending: INTERNAL MEDICINE
Payer: MEDICARE

## 2023-06-22 ENCOUNTER — OFFICE VISIT (OUTPATIENT)
Dept: INTERNAL MEDICINE | Facility: CLINIC | Age: 68
End: 2023-06-22
Attending: INTERNAL MEDICINE
Payer: MEDICARE

## 2023-06-22 VITALS
DIASTOLIC BLOOD PRESSURE: 80 MMHG | HEIGHT: 68 IN | HEART RATE: 64 BPM | WEIGHT: 201 LBS | SYSTOLIC BLOOD PRESSURE: 110 MMHG | OXYGEN SATURATION: 97 % | BODY MASS INDEX: 30.46 KG/M2

## 2023-06-22 DIAGNOSIS — Z12.5 SCREENING FOR PROSTATE CANCER: ICD-10-CM

## 2023-06-22 DIAGNOSIS — Z13.89 SCREENING FOR OBESITY: ICD-10-CM

## 2023-06-22 DIAGNOSIS — E55.9 VITAMIN D DEFICIENCY: ICD-10-CM

## 2023-06-22 DIAGNOSIS — E03.9 HYPOTHYROIDISM, UNSPECIFIED TYPE: ICD-10-CM

## 2023-06-22 DIAGNOSIS — D51.0 PERNICIOUS ANEMIA: ICD-10-CM

## 2023-06-22 DIAGNOSIS — K21.9 GASTROESOPHAGEAL REFLUX DISEASE, UNSPECIFIED WHETHER ESOPHAGITIS PRESENT: ICD-10-CM

## 2023-06-22 DIAGNOSIS — E78.9 DISORDER OF LIPID METABOLISM: ICD-10-CM

## 2023-06-22 DIAGNOSIS — H61.112 MOHS DEFECT OF HELIX OF LEFT EAR: ICD-10-CM

## 2023-06-22 DIAGNOSIS — E66.9 OBESITY (BMI 30.0-34.9): Primary | ICD-10-CM

## 2023-06-22 DIAGNOSIS — R79.89 OTHER SPECIFIED ABNORMAL FINDINGS OF BLOOD CHEMISTRY: ICD-10-CM

## 2023-06-22 DIAGNOSIS — D50.8 OTHER IRON DEFICIENCY ANEMIA: ICD-10-CM

## 2023-06-22 DIAGNOSIS — Z13.39 SCREENING FOR ALCOHOLISM: ICD-10-CM

## 2023-06-22 DIAGNOSIS — Z00.00 ROUTINE ADULT HEALTH MAINTENANCE: Primary | ICD-10-CM

## 2023-06-22 DIAGNOSIS — Z13.31 SCREENING FOR DEPRESSION: ICD-10-CM

## 2023-06-22 DIAGNOSIS — F17.210 CIGARETTE SMOKER: ICD-10-CM

## 2023-06-22 DIAGNOSIS — Z00.00 ROUTINE ADULT HEALTH MAINTENANCE: ICD-10-CM

## 2023-06-22 LAB
25(OH)D3+25(OH)D2 SERPL-MCNC: 31 NG/ML (ref 30–96)
ABO + RH BLD: NORMAL
ALBUMIN SERPL BCP-MCNC: 4.3 G/DL (ref 3.5–5.2)
ALP SERPL-CCNC: 59 U/L (ref 55–135)
ALT SERPL W/O P-5'-P-CCNC: 37 U/L (ref 10–44)
ANION GAP SERPL CALC-SCNC: 7 MMOL/L (ref 8–16)
AST SERPL-CCNC: 20 U/L (ref 10–40)
BASOPHILS # BLD AUTO: 0.03 K/UL (ref 0–0.2)
BASOPHILS NFR BLD: 0.4 % (ref 0–1.9)
BILIRUB SERPL-MCNC: 0.8 MG/DL (ref 0.1–1)
BUN SERPL-MCNC: 15 MG/DL (ref 8–23)
CALCIUM SERPL-MCNC: 10.1 MG/DL (ref 8.7–10.5)
CHLORIDE SERPL-SCNC: 105 MMOL/L (ref 95–110)
CHOLEST SERPL-MCNC: 226 MG/DL (ref 120–199)
CHOLEST/HDLC SERPL: 5 {RATIO} (ref 2–5)
CO2 SERPL-SCNC: 26 MMOL/L (ref 23–29)
COMPLEXED PSA SERPL-MCNC: 0.74 NG/ML (ref 0–4)
CREAT SERPL-MCNC: 0.9 MG/DL (ref 0.5–1.4)
DIFFERENTIAL METHOD: ABNORMAL
EOSINOPHIL # BLD AUTO: 0.3 K/UL (ref 0–0.5)
EOSINOPHIL NFR BLD: 4.1 % (ref 0–8)
ERYTHROCYTE [DISTWIDTH] IN BLOOD BY AUTOMATED COUNT: 12.6 % (ref 11.5–14.5)
EST. GFR  (NO RACE VARIABLE): >60 ML/MIN/1.73 M^2
ESTIMATED AVG GLUCOSE: 108 MG/DL (ref 68–131)
GLUCOSE SERPL-MCNC: 98 MG/DL (ref 70–110)
HBA1C MFR BLD: 5.4 % (ref 4–5.6)
HCT VFR BLD AUTO: 46.8 % (ref 40–54)
HDLC SERPL-MCNC: 45 MG/DL (ref 40–75)
HDLC SERPL: 19.9 % (ref 20–50)
HGB BLD-MCNC: 15.9 G/DL (ref 14–18)
IMM GRANULOCYTES # BLD AUTO: 0.03 K/UL (ref 0–0.04)
IMM GRANULOCYTES NFR BLD AUTO: 0.4 % (ref 0–0.5)
LDLC SERPL CALC-MCNC: 145 MG/DL (ref 63–159)
LYMPHOCYTES # BLD AUTO: 2.1 K/UL (ref 1–4.8)
LYMPHOCYTES NFR BLD: 24.9 % (ref 18–48)
MCH RBC QN AUTO: 31.5 PG (ref 27–31)
MCHC RBC AUTO-ENTMCNC: 34 G/DL (ref 32–36)
MCV RBC AUTO: 93 FL (ref 82–98)
MONOCYTES # BLD AUTO: 0.7 K/UL (ref 0.3–1)
MONOCYTES NFR BLD: 8.2 % (ref 4–15)
NEUTROPHILS # BLD AUTO: 5.2 K/UL (ref 1.8–7.7)
NEUTROPHILS NFR BLD: 62 % (ref 38–73)
NONHDLC SERPL-MCNC: 181 MG/DL
NRBC BLD-RTO: 0 /100 WBC
PLATELET # BLD AUTO: 219 K/UL (ref 150–450)
PMV BLD AUTO: 10.7 FL (ref 9.2–12.9)
POTASSIUM SERPL-SCNC: 4.1 MMOL/L (ref 3.5–5.1)
PROT SERPL-MCNC: 7.2 G/DL (ref 6–8.4)
RBC # BLD AUTO: 5.05 M/UL (ref 4.6–6.2)
SODIUM SERPL-SCNC: 138 MMOL/L (ref 136–145)
TRIGL SERPL-MCNC: 180 MG/DL (ref 30–150)
TSH SERPL DL<=0.005 MIU/L-ACNC: 1.31 UIU/ML (ref 0.4–4)
VIT B12 SERPL-MCNC: 653 PG/ML (ref 210–950)
WBC # BLD AUTO: 8.38 K/UL (ref 3.9–12.7)

## 2023-06-22 PROCEDURE — G0444 DEPRESSION SCREEN ANNUAL: HCPCS | Mod: 59,S$GLB,, | Performed by: INTERNAL MEDICINE

## 2023-06-22 PROCEDURE — 1160F PR REVIEW ALL MEDS BY PRESCRIBER/CLIN PHARMACIST DOCUMENTED: ICD-10-PCS | Mod: CPTII,S$GLB,, | Performed by: INTERNAL MEDICINE

## 2023-06-22 PROCEDURE — 3079F PR MOST RECENT DIASTOLIC BLOOD PRESSURE 80-89 MM HG: ICD-10-PCS | Mod: CPTII,S$GLB,, | Performed by: INTERNAL MEDICINE

## 2023-06-22 PROCEDURE — 99213 PR OFFICE/OUTPT VISIT, EST, LEVL III, 20-29 MIN: ICD-10-PCS | Mod: 25,S$GLB,, | Performed by: INTERNAL MEDICINE

## 2023-06-22 PROCEDURE — 3079F DIAST BP 80-89 MM HG: CPT | Mod: CPTII,S$GLB,, | Performed by: INTERNAL MEDICINE

## 2023-06-22 PROCEDURE — 83036 HEMOGLOBIN GLYCOSYLATED A1C: CPT | Performed by: INTERNAL MEDICINE

## 2023-06-22 PROCEDURE — 99213 OFFICE O/P EST LOW 20 MIN: CPT | Mod: 25,S$GLB,, | Performed by: INTERNAL MEDICINE

## 2023-06-22 PROCEDURE — G0444 PR DEPRESSION SCREENING: ICD-10-PCS | Mod: 59,S$GLB,, | Performed by: INTERNAL MEDICINE

## 2023-06-22 PROCEDURE — 82607 VITAMIN B-12: CPT | Performed by: INTERNAL MEDICINE

## 2023-06-22 PROCEDURE — 85025 COMPLETE CBC W/AUTO DIFF WBC: CPT | Performed by: INTERNAL MEDICINE

## 2023-06-22 PROCEDURE — 80061 LIPID PANEL: CPT | Performed by: INTERNAL MEDICINE

## 2023-06-22 PROCEDURE — G0442 PR  ALCOHOL SCREENING: ICD-10-PCS | Mod: 59,S$GLB,, | Performed by: INTERNAL MEDICINE

## 2023-06-22 PROCEDURE — 84443 ASSAY THYROID STIM HORMONE: CPT | Performed by: INTERNAL MEDICINE

## 2023-06-22 PROCEDURE — 3074F PR MOST RECENT SYSTOLIC BLOOD PRESSURE < 130 MM HG: ICD-10-PCS | Mod: CPTII,S$GLB,, | Performed by: INTERNAL MEDICINE

## 2023-06-22 PROCEDURE — 3008F BODY MASS INDEX DOCD: CPT | Mod: CPTII,S$GLB,, | Performed by: INTERNAL MEDICINE

## 2023-06-22 PROCEDURE — 3044F HG A1C LEVEL LT 7.0%: CPT | Mod: CPTII,S$GLB,, | Performed by: INTERNAL MEDICINE

## 2023-06-22 PROCEDURE — 3008F PR BODY MASS INDEX (BMI) DOCUMENTED: ICD-10-PCS | Mod: CPTII,S$GLB,, | Performed by: INTERNAL MEDICINE

## 2023-06-22 PROCEDURE — G0442 ANNUAL ALCOHOL SCREEN 15 MIN: HCPCS | Mod: 59,S$GLB,, | Performed by: INTERNAL MEDICINE

## 2023-06-22 PROCEDURE — 1159F PR MEDICATION LIST DOCUMENTED IN MEDICAL RECORD: ICD-10-PCS | Mod: CPTII,S$GLB,, | Performed by: INTERNAL MEDICINE

## 2023-06-22 PROCEDURE — 80053 COMPREHEN METABOLIC PANEL: CPT | Performed by: INTERNAL MEDICINE

## 2023-06-22 PROCEDURE — 1159F MED LIST DOCD IN RCRD: CPT | Mod: CPTII,S$GLB,, | Performed by: INTERNAL MEDICINE

## 2023-06-22 PROCEDURE — G0447 BEHAVIOR COUNSEL OBESITY 15M: HCPCS | Mod: 59,S$GLB,, | Performed by: INTERNAL MEDICINE

## 2023-06-22 PROCEDURE — 86900 BLOOD TYPING SEROLOGIC ABO: CPT | Performed by: INTERNAL MEDICINE

## 2023-06-22 PROCEDURE — 82306 VITAMIN D 25 HYDROXY: CPT | Performed by: INTERNAL MEDICINE

## 2023-06-22 PROCEDURE — G0447 PR OBESITY COUNSELING: ICD-10-PCS | Mod: 59,S$GLB,, | Performed by: INTERNAL MEDICINE

## 2023-06-22 PROCEDURE — 99406 PR TOBACCO USE CESSATION INTERMEDIATE 3-10 MINUTES: ICD-10-PCS | Mod: S$GLB,,, | Performed by: INTERNAL MEDICINE

## 2023-06-22 PROCEDURE — 84153 ASSAY OF PSA TOTAL: CPT | Performed by: INTERNAL MEDICINE

## 2023-06-22 PROCEDURE — 3044F PR MOST RECENT HEMOGLOBIN A1C LEVEL <7.0%: ICD-10-PCS | Mod: CPTII,S$GLB,, | Performed by: INTERNAL MEDICINE

## 2023-06-22 PROCEDURE — 99406 BEHAV CHNG SMOKING 3-10 MIN: CPT | Mod: S$GLB,,, | Performed by: INTERNAL MEDICINE

## 2023-06-22 PROCEDURE — 36415 COLL VENOUS BLD VENIPUNCTURE: CPT | Performed by: INTERNAL MEDICINE

## 2023-06-22 PROCEDURE — 3074F SYST BP LT 130 MM HG: CPT | Mod: CPTII,S$GLB,, | Performed by: INTERNAL MEDICINE

## 2023-06-22 PROCEDURE — 1160F RVW MEDS BY RX/DR IN RCRD: CPT | Mod: CPTII,S$GLB,, | Performed by: INTERNAL MEDICINE

## 2023-06-22 RX ORDER — GLUCOSAMINE/CHONDRO SU A 500-400 MG
1 TABLET ORAL 3 TIMES DAILY
COMMUNITY

## 2023-06-22 NOTE — PATIENT INSTRUCTIONS
Stay off the cigarettes.  Ask your wife not smoke around you, especially in the house or car.        Tips for Healthy Living and Routine Preventative Care - 2023                                                            (These guidelines are intended for healthy adults)      1. Exercise  Exercise aerobically with a target heart rate of (220-age) x 0.8  Exercise 30-45 minutes on most days of the week    2. Diet and Supplements- All supplements can be obtained through a varied, healthy diet   Calcium: 1,000 - 1,200 mg each day   8 oz milk or Calcium fortified O.J. = 300, 8 oz Yogurt = 400 mg, 1 oz of cheese =100-200 mg              8 oz Oatmeal = 215 mg, 3 oz [place] = 240 mg  Vitamin D: 800 iu each day- Can probably be obtained by 30 min. of direct sunlight    each day             3 oz. [place] = 800 iu,  3 oz. Tuna =150 iu, Milk or fortified O.J. = 120 iu  Fish oil: 1-2 grams each day or about 840 mg of EPA and DHA (Omega-3 fatty acids) each day             3 oz. [place]=2 grams,  3 oz. Tuna=1.3 grams,  3 oz. drained light Tuna= 0.25 grams  Folic acid 800 mcg each day for all women planning or capable of pregnancy    3. Lifestyle  Alcohol: 1 drink = 12 oz. domestic beer, 4 oz. wine, or 1 oz. hard (80 proof) liquor             Males: </= 14 drinks per week with no more than 4 in any one day             Females: </= 7 drinks per week with no more than 3 in any one day  Salt: 1.2 - 3 grams of Sodium each day.  Tobacco: Dont smoke, or quit smoking (discuss with your doctor)  Depression: If you feel depressed discuss with your doctor  Weight: Maintain a healthy body weight. Stay within 10% of:             Males: 106 lbs. + 6 lbs per inch height above 5 feet             Females: 100 lbs + 5 lbs per inch height above 5 feet    4. Routine tests  Blood pressure check at each visit, or at least once each year  HIV screening (one time) if less than 65 years old  Hepatitis C screen (one time) if less than 80 years  old  Cholesterol screening every 3 years starting at age 21  Glucose/Hemaglobin A1C check every 3 years starting at age 35.  TSH (thyroid screen) every 2 years starting at age 50  Colonoscopy at age 45, and repeat every 10 years until age 75. Consider screening until age 85. DNA stool test (Cologuard) every 3 years is an acceptable alternative.  Vision screen at age 65    Females:  Gyn exam with cervical HPV test every 3 years or Pap smear every three years starting at age 25                  Stop screening at age 65 if past 3 exams were normal                  No screening for women who have had a hysterectomy with removal of cervix  Mammogram every 1-2 years starting at age 40 until age 75  Consider continuing Mammograms every other year for those older than 75 with a life expectancy of more than 10 years  Bone density scan at about age 65    Males:  PSA screening annually at age 50, age 45 for  Americans, until age 75. Consider annual screening after age 75                   5. Immunizations  Influenza vaccine every year in the fall, especially if >50 or with a chronic disease  Tetanus/Diphtheria/Pertussis (Tdap) vaccine once (after the age of 18), then Tetanus/Diphtheria (Td) or Tdap vaccine every 10 years  Shingles (Shingrix) vaccine after age 50 and get a 2nd dose after 2-6 months  Pneumonia vaccine (Prevnar-20) at age 65       6. Advanced Directive/End of life care planning  You should consider having a signed document which informs physicians and family of your end of life care wishes.  You can go to [email or web address]. Under Step 1 click download AdobePDF and print.  This is the [place] physician order for scope of Treatment ([employer]) form.  You can also request a blank copy of the [place] form from my office.  Bring a copy of the signed document to my office so we can scan it in your medical chart.

## 2023-06-23 PROBLEM — E78.00 HIGH CHOLESTEROL: Status: ACTIVE | Noted: 2023-06-23

## 2023-06-23 PROBLEM — H61.112: Status: ACTIVE | Noted: 2023-06-23

## 2023-06-23 RX ORDER — ROSUVASTATIN CALCIUM 5 MG/1
5 TABLET, COATED ORAL DAILY
Qty: 90 TABLET | Refills: 3 | Status: SHIPPED | OUTPATIENT
Start: 2023-06-23 | End: 2024-03-27 | Stop reason: SDUPTHER

## 2023-08-08 ENCOUNTER — TELEPHONE (OUTPATIENT)
Dept: DERMATOLOGY | Facility: CLINIC | Age: 68
End: 2023-08-08
Payer: MEDICARE

## 2023-08-08 NOTE — TELEPHONE ENCOUNTER
Ep was informed with bx proven BCC left forehead per Dr. Kendall. Pt was scheduled for Mohs sx on 9/13/23 at 12:30. Pt verbally confirmed appt date and time.

## 2023-09-13 ENCOUNTER — PROCEDURE VISIT (OUTPATIENT)
Dept: DERMATOLOGY | Facility: CLINIC | Age: 68
End: 2023-09-13
Payer: MEDICARE

## 2023-09-13 VITALS
DIASTOLIC BLOOD PRESSURE: 79 MMHG | HEART RATE: 61 BPM | BODY MASS INDEX: 30.46 KG/M2 | WEIGHT: 201 LBS | HEIGHT: 68 IN | SYSTOLIC BLOOD PRESSURE: 129 MMHG

## 2023-09-13 DIAGNOSIS — C44.319 BASAL CELL CARCINOMA OF LEFT FOREHEAD: Primary | ICD-10-CM

## 2023-09-13 PROCEDURE — 17311: ICD-10-PCS | Mod: S$GLB,,, | Performed by: DERMATOLOGY

## 2023-09-13 PROCEDURE — 99499 UNLISTED E&M SERVICE: CPT | Mod: S$GLB,,, | Performed by: DERMATOLOGY

## 2023-09-13 PROCEDURE — 17311 MOHS 1 STAGE H/N/HF/G: CPT | Mod: S$GLB,,, | Performed by: DERMATOLOGY

## 2023-09-13 PROCEDURE — 17312: ICD-10-PCS | Mod: S$GLB,,, | Performed by: DERMATOLOGY

## 2023-09-13 PROCEDURE — 17312 MOHS ADDL STAGE: CPT | Mod: S$GLB,,, | Performed by: DERMATOLOGY

## 2023-09-13 PROCEDURE — 13131 PR RECMPL WND HEAD,FAC,HAND 1.1-2.5 CM: ICD-10-PCS | Mod: 51,S$GLB,, | Performed by: DERMATOLOGY

## 2023-09-13 PROCEDURE — 13131 CMPLX RPR F/C/C/M/N/AX/G/H/F: CPT | Mod: 51,S$GLB,, | Performed by: DERMATOLOGY

## 2023-09-13 PROCEDURE — 99499 NO LOS: ICD-10-PCS | Mod: S$GLB,,, | Performed by: DERMATOLOGY

## 2023-09-13 NOTE — LETTER
Mohs Micrographic Surgery and Dermatologic Surgery Record           Re: Sourav Griffith    : 1955    2023    Dear Dr. Kendall,    I had the pleasure of seeing one of your patients, Sourav Griffith, for treatment of a basal cell carcinoma.    At the time of our initial examination on 23, he had a 0.5 by 0.6 cm lesion on the left forehead.    Given the pathology and location of the lesion, Mr. Griffith decided to proceed with margin-controlled surgery. The tumor was cleared after two stages of Mohs surgery, leaving a final surgical defect that measured 0.9 by 1.0 cm.    After discussing reconstruction options, we decided to repair the wound with a complex linear closure.    Mr. Griffith was provided with wound care instructions, wound care supplies, and my contact information in case of any concerns. He will follow up with us to evaluate the wound and remove the sutures.    Thank you for allowing us to participate in the care of your patient.    Sincerely,        Randy Kessler MD          Preoperative Image             Postoperative Mohs Image            Reconstruction Image              [address] [place] [address]/ [phone] FAX [phone]

## 2023-09-13 NOTE — PROGRESS NOTES
PROCEDURE: Mohs' Micrographic Surgery    INDICATION: Biopsy-proven skin cancer of cosmetically and functionally important areas, including head, neck, genital, hand, foot, or areas known for having difficulty in healing, such as the lower anterior legs. Tumor with ill-defined borders.    REFERRING PROVIDER:  Sarahi Kendall MD    CASE NUMBER:     ANESTHETIC: 2.5 cc 0.5% Bupivacaine with Epi 1:200,000 mixed 1:1 with plain 1% Lidocaine    SURGICAL PREP: Hibiclens    SURGEON: Randy Kessler MD    ASSISTANTS: Halley Tejada PA-C, Shell Tovar MA, and Sonam Rowland, Surg Tech    PREOPERATIVE DIAGNOSIS: basal cell carcinoma- nodular    POSTOPERATIVE DIAGNOSIS: basal cell carcinoma- nodular, superficial    PATHOLOGIC DIAGNOSIS: basal cell carcinoma- nodular, superficial    HISTOLOGY OF SPECIMENS IN FIRST STAGE:   Debulking tumor confirms nodular basal cell carcinoma.  Tumor Type: Tumor seen. Superficial basal cell carcinoma: Foci of basaloid cells with peripheral palisading and focal retraction artifact arising along the dermoepidermal junction and extending into the papillary dermis.   Depth of Invasion: epidermis and dermis  Perineural Invasion: No    HISTOLOGY OF SPECIMENS IN SUBSEQUENT STAGES:  Tumor Type:  No tumor seen.    STAGES OF MOHS' SURGERY PERFORMED: 2    TUMOR-FREE PLANE ACHIEVED: Yes    HEMOSTASIS: electrocoagulation     SPECIMENS: 3 (2 in stage A and 1 in stage B)    LOCATION: left forehead (superolateral). Location verified with Dr. Kendall's clinical photograph. Patient also verified location with hand held mirror.    INITIAL LESION SIZE: 0.5 x 0.6 cm    FINAL DEFECT SIZE: 0.9 x 1.0 cm    WOUND REPAIR/DISPOSITION: The patient tolerated Mohs' Micrographic Surgery for a basal cell carcinoma very well. When the tumor was completely removed, a repair of the surgical defect was undertaken.    PROCEDURE: Complex Linear Repair    INDICATION: Status post Mohs' Micrographic Surgery for basal cell  "carcinoma.    CASE NUMBER:     SURGEON: Randy Kessler MD    ASSISTANTS: Halley Tejada PA-C and Sonam Rowland Surg King    ANESTHETIC: 1 cc 1% Lidocaine with Epinephrine 1:100,000    SURGICAL PREP: Hibiclens, prepped by Sonam Rowland Surg King    LOCATION: left forehead (superolateral)    DEFECT SIZE: 0.9 x 1.0 cm    WOUND REPAIR/DISPOSITION:  After the patient's carcinoma had been completely removed with Mohs' Micrographic Surgery, a repair of the surgical defect was undertaken. The patient was returned to the operating suite where the area of left superolateral forehead was prepped, draped, and anesthetized in the usual sterile fashion. The wound was widely undermined in all directions. The wound was undermined to a distance at least the maximum width of the defect as measured perpendicular to the closure line along at least one entire edge of the defect, in this case 1 cm. Then, electrocoagulation was used to obtain meticulous hemostasis. 5-0 Vicryl buried vertical mattress sutures were placed into the subcutaneous and dermal plane to close the wound and delroy the cutaneous wound edge. Bilateral dog ears were identified and were removed by a standard Burow's triangle technique. The cutaneous wound edges were closed using interrupted 5-0 Prolene suture.    The patient tolerated the procedure well.    The area was cleaned and dressed appropriately and the patient was given wound care instructions, as well as appointment for follow-up evaluation and suture removal in 7 days.    LENGTH OF REPAIR: 2.3 cm    Vitals:    09/13/23 1217 09/13/23 1405   BP: 133/81 129/79   BP Location: Right arm Left arm   Patient Position: Sitting Lying   BP Method: Small (Automatic) Medium (Automatic)   Pulse: 82 61   Weight: 91.2 kg (201 lb)    Height: 5' 8" (1.727 m)          "

## 2023-09-20 ENCOUNTER — OFFICE VISIT (OUTPATIENT)
Dept: DERMATOLOGY | Facility: CLINIC | Age: 68
End: 2023-09-20
Payer: MEDICARE

## 2023-09-20 DIAGNOSIS — Z09 POSTOP CHECK: Primary | ICD-10-CM

## 2023-09-20 PROCEDURE — 1126F AMNT PAIN NOTED NONE PRSNT: CPT | Mod: CPTII,S$GLB,, | Performed by: DERMATOLOGY

## 2023-09-20 PROCEDURE — 1126F PR PAIN SEVERITY QUANTIFIED, NO PAIN PRESENT: ICD-10-PCS | Mod: CPTII,S$GLB,, | Performed by: DERMATOLOGY

## 2023-09-20 PROCEDURE — 99024 PR POST-OP FOLLOW-UP VISIT: ICD-10-PCS | Mod: S$GLB,,, | Performed by: DERMATOLOGY

## 2023-09-20 PROCEDURE — 1101F PT FALLS ASSESS-DOCD LE1/YR: CPT | Mod: CPTII,S$GLB,, | Performed by: DERMATOLOGY

## 2023-09-20 PROCEDURE — 3044F HG A1C LEVEL LT 7.0%: CPT | Mod: CPTII,S$GLB,, | Performed by: DERMATOLOGY

## 2023-09-20 PROCEDURE — 3044F PR MOST RECENT HEMOGLOBIN A1C LEVEL <7.0%: ICD-10-PCS | Mod: CPTII,S$GLB,, | Performed by: DERMATOLOGY

## 2023-09-20 PROCEDURE — 1159F PR MEDICATION LIST DOCUMENTED IN MEDICAL RECORD: ICD-10-PCS | Mod: CPTII,S$GLB,, | Performed by: DERMATOLOGY

## 2023-09-20 PROCEDURE — 3288F PR FALLS RISK ASSESSMENT DOCUMENTED: ICD-10-PCS | Mod: CPTII,S$GLB,, | Performed by: DERMATOLOGY

## 2023-09-20 PROCEDURE — 99999 PR PBB SHADOW E&M-EST. PATIENT-LVL II: ICD-10-PCS | Mod: PBBFAC,,, | Performed by: DERMATOLOGY

## 2023-09-20 PROCEDURE — 99999 PR PBB SHADOW E&M-EST. PATIENT-LVL II: CPT | Mod: PBBFAC,,, | Performed by: DERMATOLOGY

## 2023-09-20 PROCEDURE — 99024 POSTOP FOLLOW-UP VISIT: CPT | Mod: S$GLB,,, | Performed by: DERMATOLOGY

## 2023-09-20 PROCEDURE — 1159F MED LIST DOCD IN RCRD: CPT | Mod: CPTII,S$GLB,, | Performed by: DERMATOLOGY

## 2023-09-20 PROCEDURE — 1101F PR PT FALLS ASSESS DOC 0-1 FALLS W/OUT INJ PAST YR: ICD-10-PCS | Mod: CPTII,S$GLB,, | Performed by: DERMATOLOGY

## 2023-09-20 PROCEDURE — 3288F FALL RISK ASSESSMENT DOCD: CPT | Mod: CPTII,S$GLB,, | Performed by: DERMATOLOGY

## 2023-09-20 NOTE — PROGRESS NOTES
68 y.o. male patient is here for suture removal following Mohs' surgery.    Patient reports no problems.    WOUND PE:  The left forehead sutures intact. Wound healing well. Good skin edges. No signs or symptoms of infection.      IMPRESSION:  Healing operative site from Mohs' surgery BCC left forehead s/p Mohs with CLC, postop day #7.    PLAN:  Sutures removed today by  Frannie Henry RN . Steri-strips applied.  Continue wound care.  Keep moist with Aquaphor.  Call if any issues arise    RTC:  In 3-6 months with  Sarahi Kendall MD  for skin check or sooner if new concern arises.

## 2024-03-27 DIAGNOSIS — E78.9 DISORDER OF LIPID METABOLISM: Primary | ICD-10-CM

## 2024-03-27 RX ORDER — ROSUVASTATIN CALCIUM 5 MG/1
5 TABLET, COATED ORAL DAILY
Qty: 90 TABLET | Refills: 0 | Status: SHIPPED | OUTPATIENT
Start: 2024-03-27 | End: 2024-06-14 | Stop reason: SDUPTHER

## 2024-06-14 ENCOUNTER — OFFICE VISIT (OUTPATIENT)
Dept: INTERNAL MEDICINE | Facility: CLINIC | Age: 69
End: 2024-06-14
Attending: INTERNAL MEDICINE
Payer: MEDICARE

## 2024-06-14 ENCOUNTER — LAB VISIT (OUTPATIENT)
Dept: LAB | Facility: OTHER | Age: 69
End: 2024-06-14
Attending: INTERNAL MEDICINE
Payer: MEDICARE

## 2024-06-14 VITALS
BODY MASS INDEX: 31.37 KG/M2 | OXYGEN SATURATION: 96 % | WEIGHT: 207 LBS | SYSTOLIC BLOOD PRESSURE: 106 MMHG | HEIGHT: 68 IN | HEART RATE: 66 BPM | DIASTOLIC BLOOD PRESSURE: 80 MMHG

## 2024-06-14 DIAGNOSIS — D51.0 PERNICIOUS ANEMIA: ICD-10-CM

## 2024-06-14 DIAGNOSIS — R79.89 OTHER SPECIFIED ABNORMAL FINDINGS OF BLOOD CHEMISTRY: ICD-10-CM

## 2024-06-14 DIAGNOSIS — K21.9 GASTROESOPHAGEAL REFLUX DISEASE, UNSPECIFIED WHETHER ESOPHAGITIS PRESENT: ICD-10-CM

## 2024-06-14 DIAGNOSIS — E78.9 DISORDER OF LIPID METABOLISM: ICD-10-CM

## 2024-06-14 DIAGNOSIS — D50.8 OTHER IRON DEFICIENCY ANEMIA: ICD-10-CM

## 2024-06-14 DIAGNOSIS — Z12.5 SCREENING FOR PROSTATE CANCER: ICD-10-CM

## 2024-06-14 DIAGNOSIS — Z13.31 SCREENING FOR DEPRESSION: ICD-10-CM

## 2024-06-14 DIAGNOSIS — Z00.00 ROUTINE ADULT HEALTH MAINTENANCE: ICD-10-CM

## 2024-06-14 DIAGNOSIS — E03.9 HYPOTHYROIDISM, UNSPECIFIED TYPE: ICD-10-CM

## 2024-06-14 DIAGNOSIS — Z12.11 COLON CANCER SCREENING: ICD-10-CM

## 2024-06-14 DIAGNOSIS — E55.9 VITAMIN D DEFICIENCY: ICD-10-CM

## 2024-06-14 DIAGNOSIS — Z13.39 SCREENING FOR ALCOHOLISM: ICD-10-CM

## 2024-06-14 DIAGNOSIS — Z13.89 SCREENING FOR OBESITY: ICD-10-CM

## 2024-06-14 DIAGNOSIS — E66.9 OBESITY (BMI 30.0-34.9): Primary | ICD-10-CM

## 2024-06-14 DIAGNOSIS — E78.9 DISORDER OF LIPID METABOLISM: Primary | ICD-10-CM

## 2024-06-14 DIAGNOSIS — E78.00 HIGH CHOLESTEROL: ICD-10-CM

## 2024-06-14 PROBLEM — F17.210 CIGARETTE SMOKER: Status: RESOLVED | Noted: 2022-06-21 | Resolved: 2024-06-14

## 2024-06-14 LAB
ALBUMIN SERPL BCP-MCNC: 4 G/DL (ref 3.5–5.2)
ALP SERPL-CCNC: 53 U/L (ref 55–135)
ALT SERPL W/O P-5'-P-CCNC: 40 U/L (ref 10–44)
ANION GAP SERPL CALC-SCNC: 8 MMOL/L (ref 8–16)
AST SERPL-CCNC: 21 U/L (ref 10–40)
BASOPHILS # BLD AUTO: 0.03 K/UL (ref 0–0.2)
BASOPHILS NFR BLD: 0.4 % (ref 0–1.9)
BILIRUB SERPL-MCNC: 0.7 MG/DL (ref 0.1–1)
BUN SERPL-MCNC: 10 MG/DL (ref 8–23)
CALCIUM SERPL-MCNC: 9.7 MG/DL (ref 8.7–10.5)
CHLORIDE SERPL-SCNC: 107 MMOL/L (ref 95–110)
CHOLEST SERPL-MCNC: 169 MG/DL (ref 120–199)
CHOLEST/HDLC SERPL: 4.1 {RATIO} (ref 2–5)
CK SERPL-CCNC: 80 U/L (ref 20–200)
CO2 SERPL-SCNC: 24 MMOL/L (ref 23–29)
COMPLEXED PSA SERPL-MCNC: 0.78 NG/ML (ref 0–4)
CREAT SERPL-MCNC: 0.9 MG/DL (ref 0.5–1.4)
DIFFERENTIAL METHOD BLD: NORMAL
EOSINOPHIL # BLD AUTO: 0.3 K/UL (ref 0–0.5)
EOSINOPHIL NFR BLD: 4 % (ref 0–8)
ERYTHROCYTE [DISTWIDTH] IN BLOOD BY AUTOMATED COUNT: 12.4 % (ref 11.5–14.5)
EST. GFR  (NO RACE VARIABLE): >60 ML/MIN/1.73 M^2
ESTIMATED AVG GLUCOSE: 111 MG/DL (ref 68–131)
GLUCOSE SERPL-MCNC: 101 MG/DL (ref 70–110)
HBA1C MFR BLD: 5.5 % (ref 4–5.6)
HCT VFR BLD AUTO: 46.3 % (ref 40–54)
HDLC SERPL-MCNC: 41 MG/DL (ref 40–75)
HDLC SERPL: 24.3 % (ref 20–50)
HGB BLD-MCNC: 15.7 G/DL (ref 14–18)
IMM GRANULOCYTES # BLD AUTO: 0.03 K/UL (ref 0–0.04)
IMM GRANULOCYTES NFR BLD AUTO: 0.4 % (ref 0–0.5)
LDLC SERPL CALC-MCNC: 95.4 MG/DL (ref 63–159)
LYMPHOCYTES # BLD AUTO: 2 K/UL (ref 1–4.8)
LYMPHOCYTES NFR BLD: 25 % (ref 18–48)
MCH RBC QN AUTO: 31 PG (ref 27–31)
MCHC RBC AUTO-ENTMCNC: 33.9 G/DL (ref 32–36)
MCV RBC AUTO: 92 FL (ref 82–98)
MONOCYTES # BLD AUTO: 0.6 K/UL (ref 0.3–1)
MONOCYTES NFR BLD: 7.8 % (ref 4–15)
NEUTROPHILS # BLD AUTO: 4.9 K/UL (ref 1.8–7.7)
NEUTROPHILS NFR BLD: 62.4 % (ref 38–73)
NONHDLC SERPL-MCNC: 128 MG/DL
NRBC BLD-RTO: 0 /100 WBC
PLATELET # BLD AUTO: 192 K/UL (ref 150–450)
PMV BLD AUTO: 10.6 FL (ref 9.2–12.9)
POTASSIUM SERPL-SCNC: 4.5 MMOL/L (ref 3.5–5.1)
PROT SERPL-MCNC: 6.7 G/DL (ref 6–8.4)
RBC # BLD AUTO: 5.06 M/UL (ref 4.6–6.2)
SODIUM SERPL-SCNC: 139 MMOL/L (ref 136–145)
TRIGL SERPL-MCNC: 163 MG/DL (ref 30–150)
TSH SERPL DL<=0.005 MIU/L-ACNC: 1.24 UIU/ML (ref 0.4–4)
VIT B12 SERPL-MCNC: 617 PG/ML (ref 210–950)
WBC # BLD AUTO: 7.79 K/UL (ref 3.9–12.7)

## 2024-06-14 PROCEDURE — 1160F RVW MEDS BY RX/DR IN RCRD: CPT | Mod: CPTII,S$GLB,, | Performed by: INTERNAL MEDICINE

## 2024-06-14 PROCEDURE — 84153 ASSAY OF PSA TOTAL: CPT | Performed by: INTERNAL MEDICINE

## 2024-06-14 PROCEDURE — G0447 BEHAVIOR COUNSEL OBESITY 15M: HCPCS | Mod: 59,S$GLB,, | Performed by: INTERNAL MEDICINE

## 2024-06-14 PROCEDURE — 3074F SYST BP LT 130 MM HG: CPT | Mod: CPTII,S$GLB,, | Performed by: INTERNAL MEDICINE

## 2024-06-14 PROCEDURE — 99214 OFFICE O/P EST MOD 30 MIN: CPT | Mod: 25,S$GLB,, | Performed by: INTERNAL MEDICINE

## 2024-06-14 PROCEDURE — 84443 ASSAY THYROID STIM HORMONE: CPT | Performed by: INTERNAL MEDICINE

## 2024-06-14 PROCEDURE — 80061 LIPID PANEL: CPT | Performed by: INTERNAL MEDICINE

## 2024-06-14 PROCEDURE — 85025 COMPLETE CBC W/AUTO DIFF WBC: CPT | Performed by: INTERNAL MEDICINE

## 2024-06-14 PROCEDURE — G0444 DEPRESSION SCREEN ANNUAL: HCPCS | Mod: 59,S$GLB,, | Performed by: INTERNAL MEDICINE

## 2024-06-14 PROCEDURE — 1159F MED LIST DOCD IN RCRD: CPT | Mod: CPTII,S$GLB,, | Performed by: INTERNAL MEDICINE

## 2024-06-14 PROCEDURE — 80053 COMPREHEN METABOLIC PANEL: CPT | Performed by: INTERNAL MEDICINE

## 2024-06-14 PROCEDURE — 3008F BODY MASS INDEX DOCD: CPT | Mod: CPTII,S$GLB,, | Performed by: INTERNAL MEDICINE

## 2024-06-14 PROCEDURE — 83036 HEMOGLOBIN GLYCOSYLATED A1C: CPT | Performed by: INTERNAL MEDICINE

## 2024-06-14 PROCEDURE — 82607 VITAMIN B-12: CPT | Performed by: INTERNAL MEDICINE

## 2024-06-14 PROCEDURE — 82550 ASSAY OF CK (CPK): CPT | Performed by: INTERNAL MEDICINE

## 2024-06-14 PROCEDURE — 36415 COLL VENOUS BLD VENIPUNCTURE: CPT | Performed by: INTERNAL MEDICINE

## 2024-06-14 PROCEDURE — 3079F DIAST BP 80-89 MM HG: CPT | Mod: CPTII,S$GLB,, | Performed by: INTERNAL MEDICINE

## 2024-06-14 PROCEDURE — G0442 ANNUAL ALCOHOL SCREEN 15 MIN: HCPCS | Mod: 59,S$GLB,, | Performed by: INTERNAL MEDICINE

## 2024-06-14 RX ORDER — ROSUVASTATIN CALCIUM 5 MG/1
5 TABLET, COATED ORAL DAILY
Qty: 90 TABLET | Refills: 3 | Status: SHIPPED | OUTPATIENT
Start: 2024-06-14 | End: 2025-06-14

## 2024-06-14 RX ORDER — UBIDECARENONE 30 MG
30 CAPSULE ORAL 3 TIMES DAILY
COMMUNITY

## 2024-06-14 RX ORDER — TADALAFIL 20 MG/1
TABLET ORAL
Qty: 15 TABLET | Refills: 5 | Status: SHIPPED | OUTPATIENT
Start: 2024-06-14

## 2024-06-14 NOTE — PROGRESS NOTES
Subjective     Patient ID: Sourav Griffith is a 69 y.o. male.    Chief Complaint: Annual Exam (Would like to get a prescription of Cialis was given to him by his Urologist years ago, joined the gym on Monday )    He has been off cigarettes for over 1 year.  He just started working out at the gym this week.  He is taking rosuvastatin for his high cholesterol.  He wants a prescription for Cialis.      Review of Systems   Constitutional: Negative.    Respiratory: Negative.     Cardiovascular: Negative.           Objective     Physical Exam  Vitals and nursing note reviewed.   Constitutional:       Appearance: He is well-developed.   HENT:      Head: Normocephalic and atraumatic.   Eyes:      Pupils: Pupils are equal, round, and reactive to light.   Cardiovascular:      Rate and Rhythm: Normal rate and regular rhythm.      Heart sounds: Normal heart sounds.   Pulmonary:      Effort: Pulmonary effort is normal.   Neurological:      Mental Status: He is alert.            Assessment and Plan     1. Obesity (BMI 30.0-34.9)    2. High cholesterol  Overview:  6/23 WBO=919, 10 yr ASCVD=13.9%      3. Disorder of lipid metabolism  -     rosuvastatin (CRESTOR) 5 MG tablet; Take 1 tablet (5 mg total) by mouth once daily.  Dispense: 90 tablet; Refill: 3    4. Colon cancer screening  -     Cologuard Screening (Multitarget Stool DNA); Future; Expected date: 06/14/2024    5. Gastroesophageal reflux disease, unspecified whether esophagitis present    6. Routine adult health maintenance    Other orders  -     tadalafiL (CIALIS) 20 MG Tab; Take 1/2-1 tablet every 36 hours as needed for ED.  Dispense: 15 tablet; Refill: 5        PLAN:  Per orders and D/C instructions.  Continue diet and/or meds for her GERD, obesity, and high cholesterol, which are stable.  Check routine labs.  Cologuard test ordered.  Cialis for ED.    Screening: The patient was screened for depression with the PHQ2 questionnaire and possible health consequences were  discussed with the patient, who understands (15 minutes spent).       The patient was screened for the misuse of alcohol, by asking the number of drinks per average week, and if pt has had more than 4 drinks (more than 3 for women and elderly) in 1 day within the past year. The health and legal consequences of misuse were discussed (15 minutes spent).       The patient was screened for obesity (BMI>30), Since the current BMI is > 30, the possible consequences of obesity, as well as the benefits of diet, exercise, and weight loss were discussed. Any behavioral risks were identified, and methods to achieve appropriate treatment goals were discussed (15 minutes spent).     Follow up in about 1 year (around 6/14/2025).

## 2025-04-07 DIAGNOSIS — E78.9 DISORDER OF LIPID METABOLISM: ICD-10-CM

## 2025-04-07 RX ORDER — ROSUVASTATIN CALCIUM 5 MG/1
5 TABLET, COATED ORAL DAILY
Qty: 90 TABLET | Refills: 0 | Status: SHIPPED | OUTPATIENT
Start: 2025-04-07 | End: 2026-04-07

## 2025-06-16 ENCOUNTER — LAB VISIT (OUTPATIENT)
Dept: LAB | Facility: OTHER | Age: 70
End: 2025-06-16
Attending: INTERNAL MEDICINE
Payer: MEDICARE

## 2025-06-16 ENCOUNTER — OFFICE VISIT (OUTPATIENT)
Dept: INTERNAL MEDICINE | Facility: CLINIC | Age: 70
End: 2025-06-16
Attending: INTERNAL MEDICINE
Payer: MEDICARE

## 2025-06-16 VITALS
SYSTOLIC BLOOD PRESSURE: 122 MMHG | HEART RATE: 71 BPM | BODY MASS INDEX: 30.6 KG/M2 | OXYGEN SATURATION: 96 % | WEIGHT: 201.88 LBS | HEIGHT: 68 IN | DIASTOLIC BLOOD PRESSURE: 86 MMHG

## 2025-06-16 DIAGNOSIS — D50.8 OTHER IRON DEFICIENCY ANEMIA: ICD-10-CM

## 2025-06-16 DIAGNOSIS — D51.0 PERNICIOUS ANEMIA: ICD-10-CM

## 2025-06-16 DIAGNOSIS — E78.9 DISORDER OF LIPID METABOLISM: Primary | ICD-10-CM

## 2025-06-16 DIAGNOSIS — R03.0 BLOOD PRESSURE ELEVATED WITHOUT HISTORY OF HTN: ICD-10-CM

## 2025-06-16 DIAGNOSIS — E03.9 HYPOTHYROIDISM, UNSPECIFIED TYPE: ICD-10-CM

## 2025-06-16 DIAGNOSIS — E55.9 VITAMIN D DEFICIENCY: ICD-10-CM

## 2025-06-16 DIAGNOSIS — R79.89 OTHER SPECIFIED ABNORMAL FINDINGS OF BLOOD CHEMISTRY: ICD-10-CM

## 2025-06-16 DIAGNOSIS — Z13.39 SCREENING FOR ALCOHOLISM: ICD-10-CM

## 2025-06-16 DIAGNOSIS — E78.9 DISORDER OF LIPID METABOLISM: ICD-10-CM

## 2025-06-16 DIAGNOSIS — E66.811 OBESITY (BMI 30.0-34.9): Primary | ICD-10-CM

## 2025-06-16 DIAGNOSIS — Z12.5 SCREENING FOR PROSTATE CANCER: ICD-10-CM

## 2025-06-16 DIAGNOSIS — E78.00 HIGH CHOLESTEROL: ICD-10-CM

## 2025-06-16 DIAGNOSIS — Z13.31 SCREENING FOR DEPRESSION: ICD-10-CM

## 2025-06-16 DIAGNOSIS — L98.9 SKIN LESION: ICD-10-CM

## 2025-06-16 DIAGNOSIS — Z13.89 SCREENING FOR OBESITY: ICD-10-CM

## 2025-06-16 LAB
25(OH)D3+25(OH)D2 SERPL-MCNC: 31 NG/ML (ref 30–96)
ABSOLUTE EOSINOPHIL (OHS): 0.28 K/UL
ABSOLUTE MONOCYTE (OHS): 0.66 K/UL (ref 0.3–1)
ABSOLUTE NEUTROPHIL COUNT (OHS): 5.45 K/UL (ref 1.8–7.7)
ALBUMIN SERPL BCP-MCNC: 4.1 G/DL (ref 3.5–5.2)
ALP SERPL-CCNC: 53 UNIT/L (ref 40–150)
ALT SERPL W/O P-5'-P-CCNC: 36 UNIT/L (ref 10–44)
ANION GAP (OHS): 12 MMOL/L (ref 8–16)
AST SERPL-CCNC: 18 UNIT/L (ref 11–45)
BASOPHILS # BLD AUTO: 0.02 K/UL
BASOPHILS NFR BLD AUTO: 0.2 %
BILIRUB SERPL-MCNC: 0.8 MG/DL (ref 0.1–1)
BUN SERPL-MCNC: 14 MG/DL (ref 8–23)
CALCIUM SERPL-MCNC: 9.5 MG/DL (ref 8.7–10.5)
CHLORIDE SERPL-SCNC: 107 MMOL/L (ref 95–110)
CHOLEST SERPL-MCNC: 162 MG/DL (ref 120–199)
CHOLEST/HDLC SERPL: 4.2 {RATIO} (ref 2–5)
CK SERPL-CCNC: 79 U/L (ref 20–200)
CO2 SERPL-SCNC: 22 MMOL/L (ref 23–29)
CREAT SERPL-MCNC: 0.8 MG/DL (ref 0.5–1.4)
EAG (OHS): 114 MG/DL (ref 68–131)
ERYTHROCYTE [DISTWIDTH] IN BLOOD BY AUTOMATED COUNT: 12.5 % (ref 11.5–14.5)
GFR SERPLBLD CREATININE-BSD FMLA CKD-EPI: >60 ML/MIN/1.73/M2
GLUCOSE SERPL-MCNC: 100 MG/DL (ref 70–110)
HBA1C MFR BLD: 5.6 % (ref 4–5.6)
HCT VFR BLD AUTO: 46.9 % (ref 40–54)
HDLC SERPL-MCNC: 39 MG/DL (ref 40–75)
HDLC SERPL: 24.1 % (ref 20–50)
HGB BLD-MCNC: 15.9 GM/DL (ref 14–18)
IMM GRANULOCYTES # BLD AUTO: 0.09 K/UL (ref 0–0.04)
IMM GRANULOCYTES NFR BLD AUTO: 1.1 % (ref 0–0.5)
LDLC SERPL CALC-MCNC: 98.8 MG/DL (ref 63–159)
LYMPHOCYTES # BLD AUTO: 1.81 K/UL (ref 1–4.8)
MAGNESIUM SERPL-MCNC: 2.2 MG/DL (ref 1.6–2.6)
MCH RBC QN AUTO: 31.4 PG (ref 27–31)
MCHC RBC AUTO-ENTMCNC: 33.9 G/DL (ref 32–36)
MCV RBC AUTO: 93 FL (ref 82–98)
NONHDLC SERPL-MCNC: 123 MG/DL
NUCLEATED RBC (/100WBC) (OHS): 0 /100 WBC
PLATELET # BLD AUTO: 192 K/UL (ref 150–450)
PMV BLD AUTO: 11.3 FL (ref 9.2–12.9)
POTASSIUM SERPL-SCNC: 4.4 MMOL/L (ref 3.5–5.1)
PROT SERPL-MCNC: 7.4 GM/DL (ref 6–8.4)
PSA SERPL-MCNC: 0.73 NG/ML
RBC # BLD AUTO: 5.07 M/UL (ref 4.6–6.2)
RELATIVE EOSINOPHIL (OHS): 3.4 %
RELATIVE LYMPHOCYTE (OHS): 21.8 % (ref 18–48)
RELATIVE MONOCYTE (OHS): 7.9 % (ref 4–15)
RELATIVE NEUTROPHIL (OHS): 65.6 % (ref 38–73)
SODIUM SERPL-SCNC: 141 MMOL/L (ref 136–145)
TRIGL SERPL-MCNC: 121 MG/DL (ref 30–150)
TSH SERPL-ACNC: 1.03 UIU/ML (ref 0.4–4)
VIT B12 SERPL-MCNC: 560 PG/ML (ref 210–950)
WBC # BLD AUTO: 8.31 K/UL (ref 3.9–12.7)

## 2025-06-16 PROCEDURE — 3079F DIAST BP 80-89 MM HG: CPT | Mod: CPTII,S$GLB,, | Performed by: INTERNAL MEDICINE

## 2025-06-16 PROCEDURE — 82306 VITAMIN D 25 HYDROXY: CPT

## 2025-06-16 PROCEDURE — 83036 HEMOGLOBIN GLYCOSYLATED A1C: CPT

## 2025-06-16 PROCEDURE — 82607 VITAMIN B-12: CPT

## 2025-06-16 PROCEDURE — 36415 COLL VENOUS BLD VENIPUNCTURE: CPT

## 2025-06-16 PROCEDURE — G0444 DEPRESSION SCREEN ANNUAL: HCPCS | Mod: 59,S$GLB,, | Performed by: INTERNAL MEDICINE

## 2025-06-16 PROCEDURE — 3008F BODY MASS INDEX DOCD: CPT | Mod: CPTII,S$GLB,, | Performed by: INTERNAL MEDICINE

## 2025-06-16 PROCEDURE — 3074F SYST BP LT 130 MM HG: CPT | Mod: CPTII,S$GLB,, | Performed by: INTERNAL MEDICINE

## 2025-06-16 PROCEDURE — 84153 ASSAY OF PSA TOTAL: CPT

## 2025-06-16 PROCEDURE — 83735 ASSAY OF MAGNESIUM: CPT

## 2025-06-16 PROCEDURE — 82550 ASSAY OF CK (CPK): CPT

## 2025-06-16 PROCEDURE — 99214 OFFICE O/P EST MOD 30 MIN: CPT | Mod: 25,S$GLB,, | Performed by: INTERNAL MEDICINE

## 2025-06-16 PROCEDURE — 82465 ASSAY BLD/SERUM CHOLESTEROL: CPT

## 2025-06-16 PROCEDURE — G0447 BEHAVIOR COUNSEL OBESITY 15M: HCPCS | Mod: 59,S$GLB,, | Performed by: INTERNAL MEDICINE

## 2025-06-16 PROCEDURE — 84443 ASSAY THYROID STIM HORMONE: CPT

## 2025-06-16 PROCEDURE — 80053 COMPREHEN METABOLIC PANEL: CPT

## 2025-06-16 PROCEDURE — 1159F MED LIST DOCD IN RCRD: CPT | Mod: CPTII,S$GLB,, | Performed by: INTERNAL MEDICINE

## 2025-06-16 PROCEDURE — G0442 ANNUAL ALCOHOL SCREEN 15 MIN: HCPCS | Mod: 59,S$GLB,, | Performed by: INTERNAL MEDICINE

## 2025-06-16 PROCEDURE — 85025 COMPLETE CBC W/AUTO DIFF WBC: CPT

## 2025-06-16 PROCEDURE — 1160F RVW MEDS BY RX/DR IN RCRD: CPT | Mod: CPTII,S$GLB,, | Performed by: INTERNAL MEDICINE

## 2025-06-16 NOTE — PATIENT INSTRUCTIONS
See Dr. Grace Dyer for the lesion on your left ear.      Check your blood pressure with an [employer] upper arm cuff every 2 weeks.  Notify my office if it is consistently greater than 130/80, and please bring your BP readings and cuff to your next appointment with me.

## 2025-06-16 NOTE — PROGRESS NOTES
Subjective     Patient ID: Sourav Griffith is a 70 y.o. male.    Chief Complaint: Annual Exam (Smashed L thumb with hammer, has a sore spot on L ear, L big toe has an involuntary twitch )    Occasionally his left big toe spasms.  He has a skin lesion on his left ear.          Hyperlipidemia  This is a chronic problem. The current episode started more than 1 year ago. The problem is controlled.     Review of Systems   Constitutional: Negative.    Respiratory: Negative.     Cardiovascular: Negative.    Integumentary:  Positive for mole/lesion.          Objective     Physical Exam  Vitals and nursing note reviewed.   Constitutional:       Appearance: He is well-developed.   HENT:      Head: Normocephalic and atraumatic.      Ears:        Comments: About 5 mm light brown skin lesion left ear  Eyes:      Pupils: Pupils are equal, round, and reactive to light.   Cardiovascular:      Rate and Rhythm: Normal rate and regular rhythm.      Heart sounds: Normal heart sounds.   Pulmonary:      Effort: Pulmonary effort is normal.   Neurological:      Mental Status: He is alert.            Assessment and Plan     1. Obesity (BMI 30.0-34.9)    2. High cholesterol  Overview:  6/23 UQL=277, 10 yr ASCVD=13.9%      3. Skin lesion  -     Ambulatory referral/consult to Dermatology; Future; Expected date: 06/23/2025    4. Screening for alcoholism    5. Screening for depression    6. Screening for obesity    7. Blood pressure elevated without history of HTN        PLAN:  Per orders and D/C instructions.  Continue diet and/or meds for Obesity and high cholesterol, which are stable.  Follow a low-sodium diet, exercise, and lose weight for elevated blood pressure today.  Refer to dermatology for skin lesion on left ear and history of skin cancer.  Check routine labs.    Screening: The patient was screened for depression with the PHQ2 questionnaire and possible health consequences were discussed with the patient, who understands (15 minutes  spent).       The patient was screened for the misuse of alcohol, by asking the number of drinks per average week, and if pt has had more than 4 drinks (more than 3 for women and elderly) in 1 day within the past year. The health and legal consequences of misuse were discussed (15 minutes spent).       The patient was screened for obesity (BMI>30), Since the current BMI is > 30, the possible consequences of obesity, as well as the benefits of diet, exercise, and weight loss were discussed. Any behavioral risks were identified, and methods to achieve appropriate treatment goals were discussed (15 minutes spent).       Follow up in about 6 months (around 12/16/2025).

## 2025-06-17 ENCOUNTER — RESULTS FOLLOW-UP (OUTPATIENT)
Dept: INTERNAL MEDICINE | Facility: CLINIC | Age: 70
End: 2025-06-17

## 2025-07-16 ENCOUNTER — DOCUMENTATION ONLY (OUTPATIENT)
Dept: INTERNAL MEDICINE | Facility: CLINIC | Age: 70
End: 2025-07-16
Payer: MEDICARE

## 2025-07-16 DIAGNOSIS — E03.9 HYPOTHYROIDISM, UNSPECIFIED TYPE: ICD-10-CM

## 2025-07-16 DIAGNOSIS — Z78.9 KNOWN MEDICAL PROBLEMS: ICD-10-CM

## 2025-07-16 DIAGNOSIS — H61.112 MOHS DEFECT OF HELIX OF LEFT EAR: ICD-10-CM

## 2025-07-16 DIAGNOSIS — D50.8 OTHER IRON DEFICIENCY ANEMIA: ICD-10-CM

## 2025-07-16 DIAGNOSIS — E78.9 DISORDER OF LIPID METABOLISM: Primary | ICD-10-CM

## 2025-07-16 NOTE — PROGRESS NOTES
Advanced primary care management (APCM) billing requirements have been met for this month.  Written consent was obtained and scanned in the electronic medical record.    My office is prepared and capable of delivering all the following APCM service elements to this Medicare beneficiary:     The patient is aware that only one practitioner can furnish and be paid for the service during a calendar month; that they have the right to stop the services at any time; and that cost sharing may apply.  Provide 24/7 access to care team/practitioner for urgent needs.  Provide continuity of care with the PCP, whom the patient can schedule successive routine appointments.  Deliver care in alternative ways to traditional office visits to best meet the patient's needs, such as Telemedecine visits.  There is overall comprehensive care management, systematic needs assessment (medical and psychosocial), and system-based approaches to ensure receipt of preventive services.    Perform medication management, reconciliation, and oversight of self-management.  Ensure timely follow-up communication with the patient and/or caregiver after an emergency department visit, discharge from hospital, skilled nursing facility, or other health care facility, within 7 calendar days of discharge.  Provide coordination of care transitions between and among health care facilities.  Ensure timely exchange of electronic health information with other practitioners and providers to support continuity of care.  Provide ongoing communication and coordination with other practitioners and other health care facilities, and document communication regarding the patient's psychosocial needs, functional deficits, goals, preferences, and desired outcomes, including cultural and linguistic factors, in the patient's medical record.  Provide enhanced opportunities for the patient or caregiver to communicate with the care team regarding the patient's care using  non-face-to-face consultation methods, such as secure messaging, patient portal, or other patient-initiated digital communications that require a clinical decision.  Analyze patient population data to identify gaps in care and offer interventions.  Risk stratify the practice population based on defined diagnoses, claims, or other electronic data to identify and target services to patients.  Be assessed through performance measurement of primary care quality, total cost of care, and meaningful use of Certified EHR Technology.  Development, implementation, revision, and maintenance of an electronic patient-centered comprehensive care plan.  The patient centered comprehensive care plan is listed under Patient Instructions for the 1st visit (or occasionally the 2nd visit) with me each calendar year. A copy is printed for the patient and it can be accessed at any time through the patient portal.  A typical comprehensive care plan includes the following recommendations:  1. Exercise  Exercise aerobically with a target heart rate of (220-age) x 0.8  Exercise 30-45 minutes on most days of the week  2. Diet and Supplements- All supplements can be obtained through a varied, healthy diet  Calcium: 1,000 - 1,200 mg each day  8 oz milk or Calcium fortified O.J. = 300, 8 oz Yogurt = 400 mg, 1 oz of cheese =100-200 mg              8 oz Oatmeal = 215 mg, 3 oz Harris = 240 mg  Vitamin D: 800 iu each day- Can probably be obtained by 30 min. of direct sunlight    each day             3 oz. Harris = 800 iu,  3 oz. Tuna =150 iu, Milk or fortified O.J. = 120 iu  Fish oil: 1-2 grams each day or about 840 mg of EPA and DHA (Omega-3 fatty acids) each day             3 oz. Harris=2 grams,  3 oz. Tuna=1.3 grams,  3 oz. drained light Tuna= 0.25 grams  Folic acid 800 mcg each day for all women planning or capable of pregnancy  3. Lifestyle  Alcohol: 1 drink = 12 oz. domestic beer, 4 oz. wine, or 1 oz. hard (80 proof) liquor             Males:  </= 14 drinks per week with no more than 4 in any one day             Females: </= 7 drinks per week with no more than 3 in any one day  Salt: 1.2 - 3 grams of Sodium each day.  Tobacco: Don't smoke, or quit smoking (discuss with your doctor)  Depression: If you feel depressed discuss with your doctor  Weight: Maintain a healthy body weight. Stay within 10% of:             Males: 106 lbs. + 6 lbs per inch height above 5 feet             Females: 100 lbs + 5 lbs per inch height above 5 feet  4. Routine tests  Blood pressure check at each visit, or at least once each year  HIV screening (one time) if less than 65 years old  Hepatitis C screen (one time) if less than 80 years old  Cholesterol screening every 3 years starting at age 21  Glucose/Hemaglobin A1C check every 3 years starting at age 35.  TSH (thyroid screen) every 2 years starting at age 50  Colonoscopy at age 45, and repeat every 10 years until age 75. Consider screening until age 85. DNA stool test (Cologuard) every 3 years is an acceptable alternative.  Vision screen at age 65  Females:  Gyn exam with cervical HPV test every 3 years or Pap smear every three years starting at age 25                  Stop screening at age 65 if past 3 exams were normal                  No screening for women who have had a hysterectomy with removal of cervix  Mammogram every 1-2 years starting at age 40 until age 75  Consider continuing Mammograms every other year for those older than 75 with a life expectancy of more than 10 years  Bone density scan at about age 65  Males:  PSA screening annually at age 50, age 45 for  Americans, until age 75. Consider annual screening after age 75          5. Immunizations  Influenza vaccine every year in the fall, especially if >50 or with a chronic disease  Consider getting a Covid booster vaccine annually  Tetanus/Diphtheria/Pertussis (Tdap) vaccine once (after the age of 18), then Tetanus/Diphtheria (Td) or Tdap vaccine every  10 years  Shingles (Shingrix) vaccine after age 50 and get a 2nd dose after 2-6 months  RSV (respiratory syncytial virus) vaccine after age 60  Pneumonia vaccine (Prevnar-20) at age 65    6. Advanced Directive/End of life care planning  You should consider having a signed document which informs physicians and family of your end of life care wishes.  You can go to [employer]/DNR/[place]. Under Step 1 click download AdobePDF and print.  This is the [place] physician order for scope of Treatment ([employer]) form.  You can also request a blank copy of the [employer] form from my office.  Bring a copy of the signed document to my office so we can scan it in your medical chart.